# Patient Record
Sex: FEMALE | Race: WHITE | NOT HISPANIC OR LATINO
[De-identification: names, ages, dates, MRNs, and addresses within clinical notes are randomized per-mention and may not be internally consistent; named-entity substitution may affect disease eponyms.]

---

## 2017-03-07 ENCOUNTER — APPOINTMENT (OUTPATIENT)
Dept: CARDIOLOGY | Facility: CLINIC | Age: 77
End: 2017-03-07

## 2017-03-07 ENCOUNTER — OUTPATIENT (OUTPATIENT)
Dept: OUTPATIENT SERVICES | Facility: HOSPITAL | Age: 77
LOS: 1 days | End: 2017-03-07
Payer: MEDICARE

## 2017-03-07 VITALS
RESPIRATION RATE: 16 BRPM | DIASTOLIC BLOOD PRESSURE: 74 MMHG | BODY MASS INDEX: 29.88 KG/M2 | SYSTOLIC BLOOD PRESSURE: 191 MMHG | HEIGHT: 64 IN | OXYGEN SATURATION: 95 % | WEIGHT: 175 LBS | HEART RATE: 61 BPM

## 2017-03-07 DIAGNOSIS — I10 ESSENTIAL (PRIMARY) HYPERTENSION: ICD-10-CM

## 2017-03-07 DIAGNOSIS — Z00.8 ENCOUNTER FOR OTHER GENERAL EXAMINATION: ICD-10-CM

## 2017-03-07 PROCEDURE — G0463: CPT | Mod: 25

## 2017-03-07 PROCEDURE — 93005 ELECTROCARDIOGRAM TRACING: CPT

## 2017-03-07 PROCEDURE — 93010 ELECTROCARDIOGRAM REPORT: CPT

## 2017-03-10 ENCOUNTER — RX RENEWAL (OUTPATIENT)
Age: 77
End: 2017-03-10

## 2017-03-23 ENCOUNTER — RX RENEWAL (OUTPATIENT)
Age: 77
End: 2017-03-23

## 2017-07-03 ENCOUNTER — LABORATORY RESULT (OUTPATIENT)
Age: 77
End: 2017-07-03

## 2017-07-03 ENCOUNTER — APPOINTMENT (OUTPATIENT)
Dept: INTERNAL MEDICINE | Facility: CLINIC | Age: 77
End: 2017-07-03

## 2017-07-03 VITALS
RESPIRATION RATE: 16 BRPM | BODY MASS INDEX: 28.99 KG/M2 | TEMPERATURE: 98.2 F | DIASTOLIC BLOOD PRESSURE: 84 MMHG | OXYGEN SATURATION: 98 % | HEIGHT: 65 IN | HEART RATE: 58 BPM | WEIGHT: 174 LBS | SYSTOLIC BLOOD PRESSURE: 140 MMHG

## 2017-07-03 DIAGNOSIS — Z82.49 FAMILY HISTORY OF ISCHEMIC HEART DISEASE AND OTHER DISEASES OF THE CIRCULATORY SYSTEM: ICD-10-CM

## 2017-07-03 DIAGNOSIS — Z83.3 FAMILY HISTORY OF DIABETES MELLITUS: ICD-10-CM

## 2017-07-05 LAB
25(OH)D3 SERPL-MCNC: 33.7 NG/ML
ALBUMIN SERPL ELPH-MCNC: 4.2 G/DL
ALP BLD-CCNC: 31 U/L
ALT SERPL-CCNC: 10 U/L
ANION GAP SERPL CALC-SCNC: 20 MMOL/L
APPEARANCE: ABNORMAL
AST SERPL-CCNC: 11 U/L
BASOPHILS # BLD AUTO: 0.07 K/UL
BASOPHILS NFR BLD AUTO: 0.8 %
BILIRUB SERPL-MCNC: 0.7 MG/DL
BILIRUBIN URINE: NEGATIVE
BLOOD URINE: NEGATIVE
BUN SERPL-MCNC: 43 MG/DL
CALCIUM SERPL-MCNC: 10.2 MG/DL
CHLORIDE SERPL-SCNC: 100 MMOL/L
CHOLEST SERPL-MCNC: 233 MG/DL
CHOLEST/HDLC SERPL: 4.5 RATIO
CO2 SERPL-SCNC: 23 MMOL/L
COLOR: YELLOW
CREAT SERPL-MCNC: 1.67 MG/DL
CREAT SPEC-SCNC: 87 MG/DL
EOSINOPHIL # BLD AUTO: 0.37 K/UL
EOSINOPHIL NFR BLD AUTO: 4.1 %
ERYTHROCYTE [SEDIMENTATION RATE] IN BLOOD BY WESTERGREN METHOD: 20 MM/HR
FOLATE SERPL-MCNC: 13.7 NG/ML
GGT SERPL-CCNC: 14 U/L
GLUCOSE QUALITATIVE U: NORMAL MG/DL
GLUCOSE SERPL-MCNC: 125 MG/DL
HBA1C MFR BLD HPLC: 5.8 %
HCT VFR BLD CALC: 37.1 %
HDLC SERPL-MCNC: 52 MG/DL
HGB BLD-MCNC: 12.3 G/DL
IMM GRANULOCYTES NFR BLD AUTO: 0.2 %
IRON SATN MFR SERPL: 38 %
IRON SERPL-MCNC: 100 UG/DL
KETONES URINE: NEGATIVE
LDLC SERPL CALC-MCNC: 151 MG/DL
LEUKOCYTE ESTERASE URINE: ABNORMAL
LYMPHOCYTES # BLD AUTO: 2.76 K/UL
LYMPHOCYTES NFR BLD AUTO: 30.2 %
MAN DIFF?: NORMAL
MCHC RBC-ENTMCNC: 32.2 PG
MCHC RBC-ENTMCNC: 33.2 GM/DL
MCV RBC AUTO: 97.1 FL
MICROALBUMIN 24H UR DL<=1MG/L-MCNC: 5.5 MG/DL
MICROALBUMIN/CREAT 24H UR-RTO: 63 MG/G
MONOCYTES # BLD AUTO: 0.93 K/UL
MONOCYTES NFR BLD AUTO: 10.2 %
NEUTROPHILS # BLD AUTO: 4.98 K/UL
NEUTROPHILS NFR BLD AUTO: 54.5 %
NITRITE URINE: NEGATIVE
PH URINE: 6.5
PLATELET # BLD AUTO: 235 K/UL
POTASSIUM SERPL-SCNC: 4.4 MMOL/L
PROT SERPL-MCNC: 6.9 G/DL
PROTEIN URINE: NEGATIVE MG/DL
RBC # BLD: 3.82 M/UL
RBC # FLD: 13.2 %
SODIUM SERPL-SCNC: 143 MMOL/L
SPECIFIC GRAVITY URINE: 1.01
T3 SERPL-MCNC: 67 NG/DL
T4 FREE SERPL-MCNC: 1.2 NG/DL
TIBC SERPL-MCNC: 266 UG/DL
TRIGL SERPL-MCNC: 152 MG/DL
TSH SERPL-ACNC: 1.55 UIU/ML
UIBC SERPL-MCNC: 166 UG/DL
UROBILINOGEN URINE: NORMAL MG/DL
VIT B12 SERPL-MCNC: 283 PG/ML
WBC # FLD AUTO: 9.13 K/UL

## 2017-09-05 ENCOUNTER — APPOINTMENT (OUTPATIENT)
Dept: CARDIOLOGY | Facility: CLINIC | Age: 77
End: 2017-09-05
Payer: MEDICARE

## 2017-09-05 ENCOUNTER — OUTPATIENT (OUTPATIENT)
Dept: OUTPATIENT SERVICES | Facility: HOSPITAL | Age: 77
LOS: 1 days | End: 2017-09-05
Payer: MEDICARE

## 2017-09-05 VITALS
WEIGHT: 174 LBS | HEART RATE: 60 BPM | DIASTOLIC BLOOD PRESSURE: 84 MMHG | OXYGEN SATURATION: 98 % | RESPIRATION RATE: 16 BRPM | HEIGHT: 65 IN | SYSTOLIC BLOOD PRESSURE: 150 MMHG | BODY MASS INDEX: 28.99 KG/M2

## 2017-09-05 DIAGNOSIS — Z00.8 ENCOUNTER FOR OTHER GENERAL EXAMINATION: ICD-10-CM

## 2017-09-05 PROCEDURE — 93005 ELECTROCARDIOGRAM TRACING: CPT

## 2017-09-05 PROCEDURE — ZZZZZ: CPT

## 2017-09-05 PROCEDURE — 93010 ELECTROCARDIOGRAM REPORT: CPT

## 2017-09-05 PROCEDURE — G0463: CPT | Mod: 25

## 2017-09-06 DIAGNOSIS — I10 ESSENTIAL (PRIMARY) HYPERTENSION: ICD-10-CM

## 2017-09-11 ENCOUNTER — RX RENEWAL (OUTPATIENT)
Age: 77
End: 2017-09-11

## 2017-09-26 ENCOUNTER — OUTPATIENT (OUTPATIENT)
Dept: OUTPATIENT SERVICES | Facility: HOSPITAL | Age: 77
LOS: 1 days | End: 2017-09-26
Payer: MEDICARE

## 2017-09-26 ENCOUNTER — APPOINTMENT (OUTPATIENT)
Dept: CARDIOLOGY | Facility: CLINIC | Age: 77
End: 2017-09-26

## 2017-09-26 DIAGNOSIS — Z00.8 ENCOUNTER FOR OTHER GENERAL EXAMINATION: ICD-10-CM

## 2017-09-26 PROCEDURE — 93306 TTE W/DOPPLER COMPLETE: CPT

## 2017-09-26 PROCEDURE — 93306 TTE W/DOPPLER COMPLETE: CPT | Mod: 26

## 2017-10-06 ENCOUNTER — MEDICATION RENEWAL (OUTPATIENT)
Age: 77
End: 2017-10-06

## 2017-10-09 ENCOUNTER — APPOINTMENT (OUTPATIENT)
Dept: INTERNAL MEDICINE | Facility: CLINIC | Age: 77
End: 2017-10-09
Payer: MEDICARE

## 2017-10-09 VITALS
SYSTOLIC BLOOD PRESSURE: 140 MMHG | WEIGHT: 170 LBS | HEIGHT: 65 IN | BODY MASS INDEX: 28.32 KG/M2 | RESPIRATION RATE: 16 BRPM | OXYGEN SATURATION: 99 % | HEART RATE: 49 BPM | DIASTOLIC BLOOD PRESSURE: 80 MMHG | TEMPERATURE: 98.2 F

## 2017-10-09 PROCEDURE — 99214 OFFICE O/P EST MOD 30 MIN: CPT | Mod: 25

## 2017-10-09 PROCEDURE — G0008: CPT

## 2017-10-09 PROCEDURE — 90686 IIV4 VACC NO PRSV 0.5 ML IM: CPT

## 2017-11-10 ENCOUNTER — RX RENEWAL (OUTPATIENT)
Age: 77
End: 2017-11-10

## 2017-11-11 LAB
ALBUMIN SERPL ELPH-MCNC: 4.1 G/DL
ALP BLD-CCNC: 29 U/L
ALT SERPL-CCNC: 5 U/L
ANION GAP SERPL CALC-SCNC: 13 MMOL/L
AST SERPL-CCNC: 11 U/L
BASOPHILS # BLD AUTO: 0.07 K/UL
BASOPHILS NFR BLD AUTO: 0.7 %
BILIRUB SERPL-MCNC: 0.7 MG/DL
BUN SERPL-MCNC: 45 MG/DL
CALCIUM SERPL-MCNC: 9.5 MG/DL
CHLORIDE SERPL-SCNC: 102 MMOL/L
CHOLEST SERPL-MCNC: 262 MG/DL
CHOLEST/HDLC SERPL: 5.7 RATIO
CO2 SERPL-SCNC: 26 MMOL/L
CREAT SERPL-MCNC: 1.74 MG/DL
EOSINOPHIL # BLD AUTO: 0.41 K/UL
EOSINOPHIL NFR BLD AUTO: 4.4 %
GGT SERPL-CCNC: 14 U/L
GLUCOSE SERPL-MCNC: 131 MG/DL
HBA1C MFR BLD HPLC: 6 %
HCT VFR BLD CALC: 37.4 %
HDLC SERPL-MCNC: 46 MG/DL
HGB BLD-MCNC: 12 G/DL
IMM GRANULOCYTES NFR BLD AUTO: 0.2 %
LDLC SERPL CALC-MCNC: 180 MG/DL
LYMPHOCYTES # BLD AUTO: 2.67 K/UL
LYMPHOCYTES NFR BLD AUTO: 28.6 %
MAN DIFF?: NORMAL
MCHC RBC-ENTMCNC: 31.3 PG
MCHC RBC-ENTMCNC: 32.1 GM/DL
MCV RBC AUTO: 97.7 FL
MONOCYTES # BLD AUTO: 0.81 K/UL
MONOCYTES NFR BLD AUTO: 8.7 %
NEUTROPHILS # BLD AUTO: 5.36 K/UL
NEUTROPHILS NFR BLD AUTO: 57.4 %
PLATELET # BLD AUTO: 266 K/UL
POTASSIUM SERPL-SCNC: 5.2 MMOL/L
PROT SERPL-MCNC: 7.2 G/DL
RBC # BLD: 3.83 M/UL
RBC # FLD: 13.2 %
SODIUM SERPL-SCNC: 141 MMOL/L
TRIGL SERPL-MCNC: 182 MG/DL
WBC # FLD AUTO: 9.34 K/UL

## 2017-11-17 ENCOUNTER — RX RENEWAL (OUTPATIENT)
Age: 77
End: 2017-11-17

## 2017-11-24 ENCOUNTER — LABORATORY RESULT (OUTPATIENT)
Age: 77
End: 2017-11-24

## 2017-11-30 ENCOUNTER — APPOINTMENT (OUTPATIENT)
Dept: NEPHROLOGY | Facility: CLINIC | Age: 77
End: 2017-11-30
Payer: MEDICARE

## 2017-11-30 VITALS
HEIGHT: 65 IN | DIASTOLIC BLOOD PRESSURE: 97 MMHG | HEART RATE: 77 BPM | SYSTOLIC BLOOD PRESSURE: 197 MMHG | WEIGHT: 175 LBS | OXYGEN SATURATION: 97 % | BODY MASS INDEX: 29.16 KG/M2

## 2017-11-30 DIAGNOSIS — N18.2 CHRONIC KIDNEY DISEASE, STAGE 2 (MILD): ICD-10-CM

## 2017-11-30 PROCEDURE — 99205 OFFICE O/P NEW HI 60 MIN: CPT

## 2017-12-15 ENCOUNTER — OUTPATIENT (OUTPATIENT)
Dept: OUTPATIENT SERVICES | Facility: HOSPITAL | Age: 77
LOS: 1 days | End: 2017-12-15
Payer: MEDICARE

## 2017-12-15 ENCOUNTER — APPOINTMENT (OUTPATIENT)
Dept: ULTRASOUND IMAGING | Facility: HOSPITAL | Age: 77
End: 2017-12-15
Payer: MEDICARE

## 2017-12-15 DIAGNOSIS — N18.2 CHRONIC KIDNEY DISEASE, STAGE 2 (MILD): ICD-10-CM

## 2017-12-15 LAB
APPEARANCE: CLEAR
BACTERIA UR CULT: ABNORMAL
BACTERIA: NEGATIVE
BILIRUBIN URINE: NEGATIVE
BLOOD URINE: NEGATIVE
COLOR: YELLOW
GLUCOSE QUALITATIVE U: NEGATIVE MG/DL
KETONES URINE: NEGATIVE
LEUKOCYTE ESTERASE URINE: ABNORMAL
MICROSCOPIC-UA: NORMAL
NITRITE URINE: NEGATIVE
PH URINE: 5.5
PROTEIN URINE: NEGATIVE MG/DL
RED BLOOD CELLS URINE: 1 /HPF
SPECIFIC GRAVITY URINE: 1.02
SQUAMOUS EPITHELIAL CELLS: 4 /HPF
UROBILINOGEN URINE: NEGATIVE MG/DL
WHITE BLOOD CELLS URINE: 3 /HPF

## 2017-12-15 PROCEDURE — 76775 US EXAM ABDO BACK WALL LIM: CPT | Mod: 26

## 2017-12-15 PROCEDURE — 76775 US EXAM ABDO BACK WALL LIM: CPT

## 2018-01-11 ENCOUNTER — APPOINTMENT (OUTPATIENT)
Dept: INTERNAL MEDICINE | Facility: CLINIC | Age: 78
End: 2018-01-11
Payer: MEDICARE

## 2018-01-11 VITALS
WEIGHT: 170 LBS | BODY MASS INDEX: 28.67 KG/M2 | HEART RATE: 57 BPM | DIASTOLIC BLOOD PRESSURE: 90 MMHG | OXYGEN SATURATION: 97 % | TEMPERATURE: 97.3 F | SYSTOLIC BLOOD PRESSURE: 150 MMHG | RESPIRATION RATE: 18 BRPM | HEIGHT: 64.5 IN

## 2018-01-11 PROCEDURE — 99214 OFFICE O/P EST MOD 30 MIN: CPT

## 2018-01-13 LAB
ALBUMIN SERPL ELPH-MCNC: 4 G/DL
ALP BLD-CCNC: 34 U/L
ALT SERPL-CCNC: 8 U/L
ANION GAP SERPL CALC-SCNC: 18 MMOL/L
AST SERPL-CCNC: 12 U/L
BASOPHILS # BLD AUTO: 0.06 K/UL
BASOPHILS NFR BLD AUTO: 0.5 %
BILIRUB SERPL-MCNC: 0.5 MG/DL
BUN SERPL-MCNC: 57 MG/DL
CALCIUM SERPL-MCNC: 10.2 MG/DL
CHLORIDE SERPL-SCNC: 104 MMOL/L
CHOLEST SERPL-MCNC: 227 MG/DL
CHOLEST/HDLC SERPL: 5 RATIO
CO2 SERPL-SCNC: 24 MMOL/L
CREAT SERPL-MCNC: 1.92 MG/DL
EOSINOPHIL # BLD AUTO: 0.37 K/UL
EOSINOPHIL NFR BLD AUTO: 3.2 %
GGT SERPL-CCNC: 12 U/L
GLUCOSE SERPL-MCNC: 146 MG/DL
HBA1C MFR BLD HPLC: 5.8 %
HCT VFR BLD CALC: 40.2 %
HDLC SERPL-MCNC: 45 MG/DL
HEMOCCULT STL QL IA: NEGATIVE
HGB BLD-MCNC: 12.7 G/DL
IMM GRANULOCYTES NFR BLD AUTO: 0.2 %
LDLC SERPL CALC-MCNC: 153 MG/DL
LYMPHOCYTES # BLD AUTO: 2.51 K/UL
LYMPHOCYTES NFR BLD AUTO: 21.8 %
MAN DIFF?: NORMAL
MCHC RBC-ENTMCNC: 31.6 GM/DL
MCHC RBC-ENTMCNC: 32.3 PG
MCV RBC AUTO: 102.3 FL
MONOCYTES # BLD AUTO: 0.86 K/UL
MONOCYTES NFR BLD AUTO: 7.5 %
NEUTROPHILS # BLD AUTO: 7.7 K/UL
NEUTROPHILS NFR BLD AUTO: 66.8 %
PLATELET # BLD AUTO: 266 K/UL
POTASSIUM SERPL-SCNC: 5.7 MMOL/L
PROT SERPL-MCNC: 7 G/DL
RBC # BLD: 3.93 M/UL
RBC # FLD: 14 %
SODIUM SERPL-SCNC: 146 MMOL/L
TRIGL SERPL-MCNC: 146 MG/DL
WBC # FLD AUTO: 11.52 K/UL

## 2018-02-23 ENCOUNTER — APPOINTMENT (OUTPATIENT)
Dept: NEPHROLOGY | Facility: CLINIC | Age: 78
End: 2018-02-23
Payer: MEDICARE

## 2018-02-23 VITALS — HEART RATE: 48 BPM | DIASTOLIC BLOOD PRESSURE: 71 MMHG | SYSTOLIC BLOOD PRESSURE: 181 MMHG

## 2018-02-23 VITALS
SYSTOLIC BLOOD PRESSURE: 222 MMHG | OXYGEN SATURATION: 98 % | HEIGHT: 64.5 IN | BODY MASS INDEX: 29.01 KG/M2 | HEART RATE: 58 BPM | WEIGHT: 172 LBS | DIASTOLIC BLOOD PRESSURE: 101 MMHG

## 2018-02-23 PROCEDURE — 99214 OFFICE O/P EST MOD 30 MIN: CPT

## 2018-03-05 LAB
ALBUMIN SERPL ELPH-MCNC: 4.2 G/DL
ALDOSTERONE SERUM: 14.8 NG/DL
ANION GAP SERPL CALC-SCNC: 15 MMOL/L
BUN SERPL-MCNC: 40 MG/DL
CALCIUM SERPL-MCNC: 10 MG/DL
CHLORIDE SERPL-SCNC: 98 MMOL/L
CO2 SERPL-SCNC: 26 MMOL/L
CREAT SERPL-MCNC: 1.56 MG/DL
GLUCOSE SERPL-MCNC: 128 MG/DL
PHOSPHATE SERPL-MCNC: 4.3 MG/DL
POTASSIUM SERPL-SCNC: 4.3 MMOL/L
RENIN ACTIVITY, PLASMA: 0.17 NG/ML/HR
SODIUM SERPL-SCNC: 139 MMOL/L

## 2018-03-23 ENCOUNTER — APPOINTMENT (OUTPATIENT)
Dept: NEPHROLOGY | Facility: CLINIC | Age: 78
End: 2018-03-23
Payer: MEDICARE

## 2018-03-23 VITALS — DIASTOLIC BLOOD PRESSURE: 81 MMHG | SYSTOLIC BLOOD PRESSURE: 187 MMHG | HEART RATE: 50 BPM

## 2018-03-23 VITALS — HEART RATE: 49 BPM | SYSTOLIC BLOOD PRESSURE: 180 MMHG | DIASTOLIC BLOOD PRESSURE: 91 MMHG

## 2018-03-23 VITALS
HEART RATE: 60 BPM | HEIGHT: 64.5 IN | SYSTOLIC BLOOD PRESSURE: 213 MMHG | DIASTOLIC BLOOD PRESSURE: 85 MMHG | WEIGHT: 170 LBS | OXYGEN SATURATION: 97 % | BODY MASS INDEX: 28.67 KG/M2

## 2018-03-23 LAB
ALBUMIN SERPL ELPH-MCNC: 4.4 G/DL
ANION GAP SERPL CALC-SCNC: 16 MMOL/L
BUN SERPL-MCNC: 36 MG/DL
CALCIUM SERPL-MCNC: 10 MG/DL
CHLORIDE SERPL-SCNC: 100 MMOL/L
CO2 SERPL-SCNC: 25 MMOL/L
CREAT SERPL-MCNC: 1.59 MG/DL
GLUCOSE SERPL-MCNC: 137 MG/DL
PHOSPHATE SERPL-MCNC: 4.1 MG/DL
POTASSIUM SERPL-SCNC: 5.9 MMOL/L
SODIUM SERPL-SCNC: 141 MMOL/L

## 2018-03-23 PROCEDURE — 99215 OFFICE O/P EST HI 40 MIN: CPT

## 2018-04-08 ENCOUNTER — LABORATORY RESULT (OUTPATIENT)
Age: 78
End: 2018-04-08

## 2018-04-16 ENCOUNTER — APPOINTMENT (OUTPATIENT)
Dept: INTERNAL MEDICINE | Facility: CLINIC | Age: 78
End: 2018-04-16
Payer: MEDICARE

## 2018-04-16 VITALS
SYSTOLIC BLOOD PRESSURE: 140 MMHG | RESPIRATION RATE: 16 BRPM | DIASTOLIC BLOOD PRESSURE: 80 MMHG | WEIGHT: 170 LBS | HEART RATE: 60 BPM | HEIGHT: 64 IN | OXYGEN SATURATION: 96 % | TEMPERATURE: 98 F | BODY MASS INDEX: 29.02 KG/M2

## 2018-04-16 PROCEDURE — 99214 OFFICE O/P EST MOD 30 MIN: CPT

## 2018-06-08 LAB
ALBUMIN SERPL ELPH-MCNC: 4.2 G/DL
ALP BLD-CCNC: 35 U/L
ALT SERPL-CCNC: 9 U/L
ANION GAP SERPL CALC-SCNC: 14 MMOL/L
AST SERPL-CCNC: 11 U/L
BASOPHILS # BLD AUTO: 0.06 K/UL
BASOPHILS NFR BLD AUTO: 0.5 %
BILIRUB SERPL-MCNC: 0.6 MG/DL
BUN SERPL-MCNC: 43 MG/DL
CALCIUM SERPL-MCNC: 9.8 MG/DL
CHLORIDE SERPL-SCNC: 101 MMOL/L
CHOLEST SERPL-MCNC: 238 MG/DL
CHOLEST/HDLC SERPL: 5.2 RATIO
CO2 SERPL-SCNC: 23 MMOL/L
CREAT SERPL-MCNC: 1.69 MG/DL
EOSINOPHIL # BLD AUTO: 0.34 K/UL
EOSINOPHIL NFR BLD AUTO: 2.9 %
GGT SERPL-CCNC: 15 U/L
GLUCOSE SERPL-MCNC: 145 MG/DL
HBA1C MFR BLD HPLC: 6.1 %
HCT VFR BLD CALC: 41.1 %
HDLC SERPL-MCNC: 46 MG/DL
HGB BLD-MCNC: 13 G/DL
IMM GRANULOCYTES NFR BLD AUTO: 0.3 %
LDLC SERPL CALC-MCNC: 150 MG/DL
LYMPHOCYTES # BLD AUTO: 2.8 K/UL
LYMPHOCYTES NFR BLD AUTO: 24.2 %
MAN DIFF?: NORMAL
MCHC RBC-ENTMCNC: 31.2 PG
MCHC RBC-ENTMCNC: 31.6 GM/DL
MCV RBC AUTO: 98.6 FL
MONOCYTES # BLD AUTO: 1 K/UL
MONOCYTES NFR BLD AUTO: 8.6 %
NEUTROPHILS # BLD AUTO: 7.34 K/UL
NEUTROPHILS NFR BLD AUTO: 63.5 %
PLATELET # BLD AUTO: 300 K/UL
POTASSIUM SERPL-SCNC: 5.1 MMOL/L
PROT SERPL-MCNC: 7.1 G/DL
RBC # BLD: 4.17 M/UL
RBC # FLD: 14.3 %
SODIUM SERPL-SCNC: 138 MMOL/L
TRIGL SERPL-MCNC: 208 MG/DL
WBC # FLD AUTO: 11.57 K/UL

## 2018-06-15 ENCOUNTER — APPOINTMENT (OUTPATIENT)
Dept: NEPHROLOGY | Facility: CLINIC | Age: 78
End: 2018-06-15
Payer: MEDICARE

## 2018-06-15 VITALS — DIASTOLIC BLOOD PRESSURE: 80 MMHG | HEART RATE: 57 BPM | SYSTOLIC BLOOD PRESSURE: 154 MMHG

## 2018-06-15 VITALS
SYSTOLIC BLOOD PRESSURE: 163 MMHG | OXYGEN SATURATION: 97 % | HEIGHT: 64 IN | DIASTOLIC BLOOD PRESSURE: 81 MMHG | WEIGHT: 173.06 LBS | HEART RATE: 60 BPM | BODY MASS INDEX: 29.55 KG/M2

## 2018-06-15 PROCEDURE — 99214 OFFICE O/P EST MOD 30 MIN: CPT

## 2018-06-26 ENCOUNTER — APPOINTMENT (OUTPATIENT)
Dept: INTERNAL MEDICINE | Facility: CLINIC | Age: 78
End: 2018-06-26
Payer: MEDICARE

## 2018-06-26 VITALS
HEART RATE: 57 BPM | WEIGHT: 168 LBS | TEMPERATURE: 97.7 F | RESPIRATION RATE: 16 BRPM | SYSTOLIC BLOOD PRESSURE: 130 MMHG | BODY MASS INDEX: 28.68 KG/M2 | OXYGEN SATURATION: 94 % | DIASTOLIC BLOOD PRESSURE: 80 MMHG | HEIGHT: 64 IN

## 2018-06-26 PROCEDURE — G0009: CPT

## 2018-06-26 PROCEDURE — 99214 OFFICE O/P EST MOD 30 MIN: CPT | Mod: 25

## 2018-06-26 PROCEDURE — 90670 PCV13 VACCINE IM: CPT

## 2018-06-26 NOTE — HISTORY OF PRESENT ILLNESS
[No therapy] : Patient is not on statin therapy [de-identified] : 78 year old  female patient with history of stable Essential Hypertension, Type 2 Diabetes Mellitus with hyperglycemia, Hypercholesterolemia with Hypertriglyceridemia, Chronic Renal Failure stage -3, history as stated, presented for follow up examination of Elevated BP checked. Patient is compliant with all medications. Denies shortness of breath, chest pain or abdominal pains at this time. ROS as stated.\par

## 2018-06-26 NOTE — PHYSICAL EXAM
[Comprehensive Foot Exam Normal] : Right and left foot were examined and both feet are normal. No ulcers in either foot. Toes are normal and with full ROM.  Normal tactile sensation with monofilament testing throughout both feet [de-identified] : Small blister left 4/5 toes. Derm f/u as counseled.

## 2018-06-26 NOTE — ASSESSMENT
[FreeTextEntry1] : 78 year old female found to have stable Essential Hypertension, Type 2 Diabetes Mellitus with hyperglycemia, Hypercholesterolemia with Hypertriglyceridemia, Chronic Renal Failure stage -3,with the current regimen, diet and life style modifications, as counseled. Prior results reviewed and discussed with the patient during today's examination. Plan as ordered.\par

## 2018-06-26 NOTE — HEALTH RISK ASSESSMENT
[Discussed at today's visit] : Advance Directives Discussed at today's visit [Fully functional (bathing, dressing, toileting, transferring, walking, feeding)] : Fully functional (bathing, dressing, toileting, transferring, walking, feeding) [Fully functional (using the telephone, shopping, preparing meals, housekeeping, doing laundry, using] : Fully functional and needs no help or supervision to perform IADLs (using the telephone, shopping, preparing meals, housekeeping, doing laundry, using transportation, managing medications and managing finances) [No falls in past year] : Patient reported no falls in the past year [0] : 2) Feeling down, depressed, or hopeless: Not at all (0) [] : No [de-identified] : NEPH [[id number]] : 0

## 2018-07-09 ENCOUNTER — APPOINTMENT (OUTPATIENT)
Dept: INTERNAL MEDICINE | Facility: CLINIC | Age: 78
End: 2018-07-09

## 2018-07-11 ENCOUNTER — APPOINTMENT (OUTPATIENT)
Dept: PODIATRY | Facility: CLINIC | Age: 78
End: 2018-07-11

## 2018-07-11 ENCOUNTER — OUTPATIENT (OUTPATIENT)
Dept: OUTPATIENT SERVICES | Facility: HOSPITAL | Age: 78
LOS: 1 days | End: 2018-07-11
Payer: MEDICARE

## 2018-07-11 VITALS
DIASTOLIC BLOOD PRESSURE: 81 MMHG | SYSTOLIC BLOOD PRESSURE: 189 MMHG | OXYGEN SATURATION: 95 % | WEIGHT: 168 LBS | RESPIRATION RATE: 18 BRPM | BODY MASS INDEX: 28.68 KG/M2 | HEIGHT: 64 IN | HEART RATE: 68 BPM

## 2018-07-11 DIAGNOSIS — Z00.00 ENCOUNTER FOR GENERAL ADULT MEDICAL EXAMINATION WITHOUT ABNORMAL FINDINGS: ICD-10-CM

## 2018-07-11 DIAGNOSIS — L11.0 ACQUIRED KERATOSIS FOLLICULARIS: ICD-10-CM

## 2018-07-11 DIAGNOSIS — M20.42 OTHER HAMMER TOE(S) (ACQUIRED), LEFT FOOT: ICD-10-CM

## 2018-07-11 PROCEDURE — G0463: CPT

## 2018-07-11 PROCEDURE — 11055 PARING/CUTG B9 HYPRKER LES 1: CPT

## 2018-07-25 ENCOUNTER — APPOINTMENT (OUTPATIENT)
Dept: PODIATRY | Facility: CLINIC | Age: 78
End: 2018-07-25

## 2018-07-25 ENCOUNTER — OUTPATIENT (OUTPATIENT)
Dept: OUTPATIENT SERVICES | Facility: HOSPITAL | Age: 78
LOS: 1 days | End: 2018-07-25
Payer: MEDICARE

## 2018-07-25 VITALS
DIASTOLIC BLOOD PRESSURE: 69 MMHG | HEIGHT: 64 IN | WEIGHT: 168 LBS | RESPIRATION RATE: 18 BRPM | OXYGEN SATURATION: 85 % | SYSTOLIC BLOOD PRESSURE: 124 MMHG | TEMPERATURE: 97.7 F | HEART RATE: 85 BPM | BODY MASS INDEX: 28.68 KG/M2

## 2018-07-25 DIAGNOSIS — Z00.00 ENCOUNTER FOR GENERAL ADULT MEDICAL EXAMINATION WITHOUT ABNORMAL FINDINGS: ICD-10-CM

## 2018-07-25 PROCEDURE — G0463: CPT

## 2018-07-26 DIAGNOSIS — M20.42 OTHER HAMMER TOE(S) (ACQUIRED), LEFT FOOT: ICD-10-CM

## 2018-07-26 DIAGNOSIS — M79.672 PAIN IN LEFT FOOT: ICD-10-CM

## 2018-09-28 ENCOUNTER — APPOINTMENT (OUTPATIENT)
Dept: NEPHROLOGY | Facility: CLINIC | Age: 78
End: 2018-09-28
Payer: MEDICARE

## 2018-09-28 VITALS
WEIGHT: 165.34 LBS | BODY MASS INDEX: 28.23 KG/M2 | OXYGEN SATURATION: 98 % | HEIGHT: 64 IN | DIASTOLIC BLOOD PRESSURE: 71 MMHG | HEART RATE: 65 BPM | SYSTOLIC BLOOD PRESSURE: 166 MMHG

## 2018-09-28 VITALS — DIASTOLIC BLOOD PRESSURE: 74 MMHG | HEART RATE: 53 BPM | SYSTOLIC BLOOD PRESSURE: 148 MMHG

## 2018-09-28 PROCEDURE — 99214 OFFICE O/P EST MOD 30 MIN: CPT

## 2018-10-16 ENCOUNTER — APPOINTMENT (OUTPATIENT)
Dept: INTERNAL MEDICINE | Facility: CLINIC | Age: 78
End: 2018-10-16
Payer: MEDICARE

## 2018-10-16 ENCOUNTER — NON-APPOINTMENT (OUTPATIENT)
Age: 78
End: 2018-10-16

## 2018-10-16 VITALS
TEMPERATURE: 97.8 F | DIASTOLIC BLOOD PRESSURE: 80 MMHG | WEIGHT: 167 LBS | SYSTOLIC BLOOD PRESSURE: 140 MMHG | RESPIRATION RATE: 16 BRPM | OXYGEN SATURATION: 97 % | HEART RATE: 90 BPM | HEIGHT: 64 IN | BODY MASS INDEX: 28.51 KG/M2

## 2018-10-16 PROCEDURE — 93000 ELECTROCARDIOGRAM COMPLETE: CPT

## 2018-10-16 PROCEDURE — 99214 OFFICE O/P EST MOD 30 MIN: CPT | Mod: 25

## 2018-10-16 NOTE — HISTORY OF PRESENT ILLNESS
[de-identified] : 78 year old  female patient with history of stable Essential Hypertension, Type 2 Diabetes Mellitus with hyperglycemia, Hypercholesterolemia with Hypertriglyceridemia, Chronic Renal Failure stage -3, history as stated, presented for follow up examination of Elevated BP checked. Patient is compliant with all medications. Denies shortness of breath, chest pain or abdominal pains at this time. ROS as stated.\par

## 2018-10-16 NOTE — ASSESSMENT
[FreeTextEntry1] : 78 year old female found to have stable ,with the current regimen, diet and life style modifications, as counseled. Prior results reviewed and discussed with the patient during today's examination. Plan as ordered.\par EKG showed NSR at the rate of 61 BPM, LAD, known non-sp ST-T changes noted.\par Patient was recently evaluated by [facility], findings and recommendations reviewed with the patient during today's examination.\par

## 2018-10-16 NOTE — HEALTH RISK ASSESSMENT
[No falls in past year] : Patient reported no falls in the past year [0] : 2) Feeling down, depressed, or hopeless: Not at all (0) [] : No [de-identified] : NEPH [[id number]] : 0

## 2018-10-25 ENCOUNTER — APPOINTMENT (OUTPATIENT)
Dept: PODIATRY | Facility: CLINIC | Age: 78
End: 2018-10-25

## 2018-10-25 LAB
25(OH)D3 SERPL-MCNC: 46.9 NG/ML
ALBUMIN SERPL ELPH-MCNC: 4.3 G/DL
ALP BLD-CCNC: 30 U/L
ALT SERPL-CCNC: 6 U/L
ANION GAP SERPL CALC-SCNC: 16 MMOL/L
AST SERPL-CCNC: 8 U/L
BASOPHILS # BLD AUTO: 0.04 K/UL
BASOPHILS NFR BLD AUTO: 0.4 %
BILIRUB SERPL-MCNC: 0.7 MG/DL
BUN SERPL-MCNC: 50 MG/DL
CALCIUM SERPL-MCNC: 9.9 MG/DL
CHLORIDE SERPL-SCNC: 99 MMOL/L
CHOLEST SERPL-MCNC: 223 MG/DL
CHOLEST/HDLC SERPL: 5.7 RATIO
CO2 SERPL-SCNC: 24 MMOL/L
CREAT SERPL-MCNC: 2.01 MG/DL
EOSINOPHIL # BLD AUTO: 0.35 K/UL
EOSINOPHIL NFR BLD AUTO: 3.4 %
ERYTHROCYTE [SEDIMENTATION RATE] IN BLOOD BY WESTERGREN METHOD: 10 MM/HR
FOLATE SERPL-MCNC: 8 NG/ML
GGT SERPL-CCNC: 19 U/L
GLUCOSE SERPL-MCNC: 120 MG/DL
HBA1C MFR BLD HPLC: 5.8 %
HCT VFR BLD CALC: 40.2 %
HDLC SERPL-MCNC: 39 MG/DL
HGB BLD-MCNC: 13.2 G/DL
IMM GRANULOCYTES NFR BLD AUTO: 0.1 %
IRON SATN MFR SERPL: 33 %
IRON SERPL-MCNC: 103 UG/DL
LDLC SERPL CALC-MCNC: 155 MG/DL
LYMPHOCYTES # BLD AUTO: 3.56 K/UL
LYMPHOCYTES NFR BLD AUTO: 35.1 %
MAN DIFF?: NORMAL
MCHC RBC-ENTMCNC: 32.3 PG
MCHC RBC-ENTMCNC: 32.8 GM/DL
MCV RBC AUTO: 98.3 FL
MONOCYTES # BLD AUTO: 0.89 K/UL
MONOCYTES NFR BLD AUTO: 8.8 %
NEUTROPHILS # BLD AUTO: 5.3 K/UL
NEUTROPHILS NFR BLD AUTO: 52.2 %
PLATELET # BLD AUTO: 298 K/UL
POTASSIUM SERPL-SCNC: 4.4 MMOL/L
PROT SERPL-MCNC: 7.3 G/DL
RBC # BLD: 4.09 M/UL
RBC # FLD: 13.5 %
SODIUM SERPL-SCNC: 139 MMOL/L
T3 SERPL-MCNC: 74 NG/DL
T4 FREE SERPL-MCNC: 1.3 NG/DL
TIBC SERPL-MCNC: 313 UG/DL
TRIGL SERPL-MCNC: 147 MG/DL
TSH SERPL-ACNC: 2.35 UIU/ML
UIBC SERPL-MCNC: 210 UG/DL
VIT B12 SERPL-MCNC: 386 PG/ML
WBC # FLD AUTO: 10.15 K/UL

## 2018-10-26 ENCOUNTER — CLINICAL ADVICE (OUTPATIENT)
Age: 78
End: 2018-10-26

## 2018-10-30 ENCOUNTER — APPOINTMENT (OUTPATIENT)
Dept: INTERNAL MEDICINE | Facility: CLINIC | Age: 78
End: 2018-10-30
Payer: MEDICARE

## 2018-10-30 LAB
APPEARANCE: CLEAR
BACTERIA: NEGATIVE
BILIRUBIN URINE: NEGATIVE
BLOOD URINE: NEGATIVE
COLOR: YELLOW
CREAT SPEC-SCNC: 105 MG/DL
CREAT/PROT UR: 0.1 RATIO
GLUCOSE QUALITATIVE U: NEGATIVE MG/DL
KETONES URINE: NEGATIVE
LEUKOCYTE ESTERASE URINE: ABNORMAL
MICROSCOPIC-UA: NORMAL
NITRITE URINE: NEGATIVE
PH URINE: 5.5
PROT UR-MCNC: 8 MG/DL
PROTEIN URINE: NEGATIVE MG/DL
RED BLOOD CELLS URINE: 2 /HPF
SPECIFIC GRAVITY URINE: 1.02
SQUAMOUS EPITHELIAL CELLS: 5 /HPF
URINE COMMENTS: NORMAL
UROBILINOGEN URINE: 1 MG/DL
WHITE BLOOD CELLS URINE: 1 /HPF

## 2018-10-30 PROCEDURE — G0008: CPT

## 2018-10-30 PROCEDURE — 90662 IIV NO PRSV INCREASED AG IM: CPT

## 2018-10-30 NOTE — ASSESSMENT
[FreeTextEntry1] : Injection of Flu Vaccine, 0.5 ML, given intramuscularly in the left deltoid today.\par

## 2018-12-03 ENCOUNTER — APPOINTMENT (OUTPATIENT)
Dept: INTERNAL MEDICINE | Facility: CLINIC | Age: 78
End: 2018-12-03
Payer: MEDICARE

## 2018-12-03 ENCOUNTER — FORM ENCOUNTER (OUTPATIENT)
Age: 78
End: 2018-12-03

## 2018-12-03 VITALS
HEART RATE: 65 BPM | TEMPERATURE: 98.1 F | DIASTOLIC BLOOD PRESSURE: 80 MMHG | OXYGEN SATURATION: 93 % | BODY MASS INDEX: 28.68 KG/M2 | RESPIRATION RATE: 16 BRPM | SYSTOLIC BLOOD PRESSURE: 130 MMHG | WEIGHT: 168 LBS | HEIGHT: 64 IN

## 2018-12-03 PROCEDURE — 99214 OFFICE O/P EST MOD 30 MIN: CPT

## 2018-12-03 NOTE — HEALTH RISK ASSESSMENT
[No falls in past year] : Patient reported no falls in the past year [0] : 2) Feeling down, depressed, or hopeless: Not at all (0) [] : No [de-identified] : None [[id number]] : 0

## 2018-12-03 NOTE — ASSESSMENT
[FreeTextEntry1] : 78 year old female found to have stable Essential Hypertension, Type 2 Diabetes Mellitus with hyperglycemia, Hypercholesterolemia with Hypertriglyceridemia, Chronic Renal Failure stage -3,with the current regimen, diet and life style modifications, as counseled. Prior results reviewed and discussed with the patient during today's examination. Plan as ordered.\par Current symptoms are consistent with Right sided musculoskeletal pain, R/O Pleurisy/ Atypical PNA,  as counseled.

## 2018-12-03 NOTE — HISTORY OF PRESENT ILLNESS
[de-identified] : 78 year old  female patient with history of stable Essential Hypertension, Type 2 Diabetes Mellitus with hyperglycemia, Hypercholesterolemia with Hypertriglyceridemia, Chronic Renal Failure stage -3, history as stated, presented for follow up examination of Elevated BP checked and with c/o right sided chest pain, pleuritic, for 3-4 days, no fever or cough, improves with NSAID prn. Patient is compliant with all medications. Denies shortness of breath, chest pain or abdominal pains at this time. ROS as stated.\par

## 2018-12-04 ENCOUNTER — OUTPATIENT (OUTPATIENT)
Dept: OUTPATIENT SERVICES | Facility: HOSPITAL | Age: 78
LOS: 1 days | End: 2018-12-04
Payer: MEDICARE

## 2018-12-04 DIAGNOSIS — R07.89 OTHER CHEST PAIN: ICD-10-CM

## 2018-12-04 PROCEDURE — 71046 X-RAY EXAM CHEST 2 VIEWS: CPT

## 2018-12-04 PROCEDURE — 71046 X-RAY EXAM CHEST 2 VIEWS: CPT | Mod: 26

## 2019-01-04 ENCOUNTER — APPOINTMENT (OUTPATIENT)
Dept: NEPHROLOGY | Facility: CLINIC | Age: 79
End: 2019-01-04
Payer: MEDICARE

## 2019-01-04 VITALS — SYSTOLIC BLOOD PRESSURE: 152 MMHG | DIASTOLIC BLOOD PRESSURE: 71 MMHG | HEART RATE: 49 BPM

## 2019-01-04 VITALS
HEART RATE: 55 BPM | DIASTOLIC BLOOD PRESSURE: 79 MMHG | OXYGEN SATURATION: 97 % | HEIGHT: 64 IN | WEIGHT: 172 LBS | BODY MASS INDEX: 29.37 KG/M2 | SYSTOLIC BLOOD PRESSURE: 163 MMHG

## 2019-01-04 PROCEDURE — 99214 OFFICE O/P EST MOD 30 MIN: CPT

## 2019-01-04 NOTE — HISTORY OF PRESENT ILLNESS
[FreeTextEntry1] : Contacts:\par 	Dr. Harlan Edmond (PCP)\par 	Dr. Cata Swift (cardiology)\par 	cell, [phone]\par 	\par -------------------------------------------------------------------------------\par Problem List:\par 	Chronic kidney disease\par 		Stable CKD IIIb (SCr 1.5-1.7)\par 		Small amount of albumin-predominant proteinuria (200mg protein, 112mg albumin)\par 	Hypertension x20+ years, difficult to control at times\par 	Pre-diabetes mellitus\par 	Osteoarthritis, sciatica\par 	History of basal cell cancer\par \par -------------------------------------------------------------------------------\par HPI:\par Ms. Jim is a 78 year old woman with chronic kidney disease stage III, uncontrolled hypertension, pre-DM, borderline obesity, and osteroarthritis, here for hypertension and renal follow up.\par \par Last visit in 2018. No changes were made at that time. She has been following with Dr. Edmond, and had some dyspnea, which was thought to be pleurisy. She is now feeling fine. Due to concern for rising creatinine on most recent labs, her metformin was discontinued.\par \par She notes recently eating "a ton of [place] food". She is otherwise well, although a bit of lag in energy. Otherwise doing well and tolerating her medications withouth problems. Ms. Jim has no headache, lightheadedness, dizziness, sore throat, cough, dyspnea, chest pain, abdominal pain, diarrhea, constipation, dysuria, hematuria. She does get aches and pains in her feet/joints sometimes.\par \par ROS: All other systems were reviewed and are negative, except as per HPI.\par 	\par -------------------------------------------------------------------------------\par Social History:\par 	From [place], lives in [place], [place], for last 35+ years\par 	Retired 2016; worked as \par 	Lives with \par 	Former smoker, quit 30+ years ago; max several cigs per day\par 	Single alcoholic drink per day, on average\par \par Family History:\par 	Parents were first cousins\par 	Father had DM and  of 58 of diabetic complications (had heart attack); maternal grandmother had DM\par 	Mother had hypertension,  age 92\par 	Brother  of mesothelioma age 62\par 	No known history of renal disease, hematuria, proteinuria, ESRD, dialysis, transplant\par \par -------------------------------------------------------------------------------\par Allergies: Codeine, opioids, latex, pineapple, chocolate\par \par Medications:\par 	Amlodipine 5mg daily\par 	Atenolol 50mg BID\par 	Losartan 100mg daily\par 	Chlorthalidone 25mg daily\par 	Spironolactone 25mg daily\par 	\par 	Calcium citrate + D3 500mg + 400 units daily\par 	Aspirin 81mg daily\par 	Ketoconazole cream\par 	\par -------------------------------------------------------------------------------\par Physical Exam:\par 	Gen: NAD, well-appearing\par 	HEENT: Supple neck\par 	Pulm: CTA\par 	CV: RRR\par 	Back: No spinal or CVA terndeness\par 	Abd: +BS, soft, nontender/nondistended\par 	UE: Warm, FROM, intact strength\par 	LE: Warm, FROM, intact strength; trace bilateral edema\par 	Neuro: No focal deficits, intact gait\par 	Psych: Normal affect\par 	Skin: Warm, without rashes\par 	\par -------------------------------------------------------------------------------\par Labs/Studies:\par [Reviewed in [employer]] \par \par 	2018-10-24\par \par 		139 | 99 | 50\par 		-----------------< 120 Ca 9.9 eGFR 23\par 		4.4 | 24 | 2.01\par 		\par 		Albumin 4.3\par \par 	Creatinine Trend\par 		2018-10-24 SCr 2.01\par 		2018 SCr 1.69\par 		2018 SCr 1.58\par 		2018 SCr 1.59\par 		2018 SCr 1.56\par 		2018 SCr 1.92\par 		2017 SCr 1.76\par 		2017 SCr 1.74\par 		2017 SCr 1.67\par 		2016 SCr 1.27\par 		\par 	2018 U/A: protein negative, blood negative\par 	2018 UPC ratio = 0.1 g/g\par 	2017 UPC ratio = 0.2 g/g\par 	2017 UAC ratio = 112mg/g\par 		\par 	2018-10-24 CBC: WBC 10.2 / hgb 13.2 / plt 298\par 	2018-10-24 Lipid: chol 223, , HDL 39, \par 	2018-10-24 HbA1c 5.8\par 	2018-10-24 Vitamin D (25OH) 46.9\par 	 \par 	2018 Renin activity 0.174 ng/mL/hr\par 	2018 Aldosterone 14.8 \par

## 2019-01-04 NOTE — ASSESSMENT
[FreeTextEntry1] : Ms. Jim is a 78 year old woman with chronic kidney disease stage III, uncontrolled hypertension, pre-DM, borderline obesity, and osteroarthritis, here for hypertension and renal follow up.\par \par Ms. Jim has had generally stable CKD stage III ,although her most recent creatinine is elevated to 2. We will recheck today.\par \par Her blood pressure outside the office has been well-controlled, despite it being high today. She isn't surprised as her diet worsened around the holidays. I suspect it'll settle down, so will make no changes today.\par \par CKD III; hypertension; hyperkalemia\par - Cont. losartan, chlorthalidone, spironolactone, atenalol, amlodipine\par - Hyperkalemia very mild and controlled at this time\par \par Pre-DM, controlled\par - Cont. current meds\par \par Dyslipidemia with elevated LDL and TG: Defer to Dr. Swift and Dr. Edmond\par \par Cautioned to avoid regular NSAID use.\par \par \par F/u 3 months.\par Renewed all antihypertensives to [facility]\par Renal panel today.

## 2019-01-07 LAB
ALBUMIN SERPL ELPH-MCNC: 4.2 G/DL
ANION GAP SERPL CALC-SCNC: 13 MMOL/L
BUN SERPL-MCNC: 37 MG/DL
CALCIUM SERPL-MCNC: 10.4 MG/DL
CHLORIDE SERPL-SCNC: 103 MMOL/L
CO2 SERPL-SCNC: 25 MMOL/L
CREAT SERPL-MCNC: 1.73 MG/DL
GLUCOSE SERPL-MCNC: 126 MG/DL
PHOSPHATE SERPL-MCNC: 4 MG/DL
POTASSIUM SERPL-SCNC: 5.5 MMOL/L
SODIUM SERPL-SCNC: 141 MMOL/L

## 2019-01-22 ENCOUNTER — APPOINTMENT (OUTPATIENT)
Dept: INTERNAL MEDICINE | Facility: CLINIC | Age: 79
End: 2019-01-22

## 2019-02-05 ENCOUNTER — APPOINTMENT (OUTPATIENT)
Dept: INTERNAL MEDICINE | Facility: CLINIC | Age: 79
End: 2019-02-05
Payer: MEDICARE

## 2019-02-05 VITALS
WEIGHT: 173 LBS | HEART RATE: 61 BPM | BODY MASS INDEX: 29.53 KG/M2 | RESPIRATION RATE: 16 BRPM | TEMPERATURE: 97.1 F | SYSTOLIC BLOOD PRESSURE: 140 MMHG | OXYGEN SATURATION: 95 % | DIASTOLIC BLOOD PRESSURE: 80 MMHG | HEIGHT: 64 IN

## 2019-02-05 PROCEDURE — 99214 OFFICE O/P EST MOD 30 MIN: CPT

## 2019-02-05 NOTE — HISTORY OF PRESENT ILLNESS
[de-identified] : 79 year old  female patient with history of stable Essential Hypertension, Type 2 Diabetes Mellitus with hyperglycemia, Hypercholesterolemia with Hypertriglyceridemia, Chronic Renal Failure stage -3, Arthritis, history as stated, presented for follow up examination. Patient is compliant with all medications. Denies shortness of breath, chest pain or abdominal pains at this time. ROS as stated.\par

## 2019-02-05 NOTE — ASSESSMENT
[FreeTextEntry1] : 79 year old female found to have stable Essential Hypertension, Type 2 Diabetes Mellitus with hyperglycemia, Hypercholesterolemia with Hypertriglyceridemia, Chronic Renal Failure stage -3, Arthritis,with the current regimen, diet and life style modifications, as counseled. Prior results reviewed and discussed with the patient during today's examination. Plan as ordered.\par Patient was recently evaluated by [facility], findings and recommendations reviewed with the patient during today's examination.\par

## 2019-04-12 ENCOUNTER — APPOINTMENT (OUTPATIENT)
Dept: NEPHROLOGY | Facility: CLINIC | Age: 79
End: 2019-04-12
Payer: MEDICARE

## 2019-04-12 VITALS
OXYGEN SATURATION: 97 % | DIASTOLIC BLOOD PRESSURE: 77 MMHG | HEIGHT: 64 IN | BODY MASS INDEX: 30.11 KG/M2 | SYSTOLIC BLOOD PRESSURE: 152 MMHG | WEIGHT: 176.37 LBS | HEART RATE: 55 BPM

## 2019-04-12 VITALS — HEART RATE: 54 BPM | SYSTOLIC BLOOD PRESSURE: 148 MMHG | DIASTOLIC BLOOD PRESSURE: 74 MMHG

## 2019-04-12 PROCEDURE — 99215 OFFICE O/P EST HI 40 MIN: CPT

## 2019-04-12 NOTE — CONSULT LETTER
[Dear  ___] : Dear  [unfilled], [Courtesy Letter:] : I had the pleasure of seeing your patient, [unfilled], in my office today. [Please see my note below.] : Please see my note below. [Sincerely,] : Sincerely, [FreeTextEntry3] : Mohsen Guthrie MD\par Nephrology\par

## 2019-04-12 NOTE — ASSESSMENT
[FreeTextEntry1] : Ms. Jim is a 79 year old woman with chronic kidney disease stage IV with proteinuria, uncontrolled hypertension, pre-DM, borderline obesity, and osteroarthritis, here for hypertension and renal follow up.\par \par Ms. Jim has had generally stable CKD (now early stage IV) without proteinuria. Her blood pressure outside the office has been reportedly reasonably well-controlled, despite it being consistenlty high (or borderline high) in the office. \par \par CKD IV; hypertension; hyperkalemia\par - Cont. chlorthalidone, spironolactone, atenalol, amlodipine\par - STOP losartan; START telmistartan 80mg daily\par - Hyperkalemia very mild and controlled at this time\par \par Pre-DM, controlled\par - Cont. current meds\par \par Dyslipidemia with elevated LDL and TG: Discussed the likely need for a statin given persistent dyslipidemia, but Ms. Jim is going to work on her diet, weight, and exercise, and we mostly focused on BP control today\par \par Cautioned to avoid regular NSAID use. Discussed importance of regular physical activity; portion control and weight loss; and decrease in alcohol intake to 1 drink per day.\par \par \par Check labs today, follow up in 3 months.\par \par \par I have spent a total of 40 minutes in which >50% was spent in discussion with patient regarding chronic kidney disease, hypertension, diet (including counseling on salt and alcohol intake).

## 2019-04-12 NOTE — HISTORY OF PRESENT ILLNESS
[FreeTextEntry1] : Contacts:\par 	Dr. Harlan Edmond (PCP)\par 	cell, [phone]\par 	\par -------------------------------------------------------------------------------\par Problem List:\par 	Chronic kidney disease\par 		CKD IV (SCr 1.6-1.7)\par 		Small amount of albumin-predominant proteinuria (200mg protein, 112mg albumin)\par 	Hypertension x20+ years, difficult to control at times\par 	Pre-diabetes mellitus\par 	Osteoarthritis, sciatica\par 	History of basal cell cancer\par \par -------------------------------------------------------------------------------\par HPI:\par Ms. 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 No changes were made at that time. She has been following with Dr. Edmond Today is 56 anniversay; she is planning on going to an [place] restaurant for dinner.\par \par Her arthritis is bothering her, and she uses Aleve about once per week. Her diet has been "pretty rich" of late, and she is up to 2 drinks per day (Scotch). She "tries" to avoid desserts. Of note, her weight has been steadily increasing over the last year, from about 168 to now 176 lbs.\par \par BP at home, which she checks occasionally, has been systolic 130-140.\par \par She says she eats lots of vegetables (her  cooks at home, and makes lots of vegetables). She also walks once per day.\par \par Ms. Jim has no headache, lightheadedness, dizziness, sore throat, cough, dyspnea, chest pain, abdominal pain, diarrhea, constipation, dysuria, hematuria. She does get aches and pains in her feet/joints sometimes (arthritis).\par \par ROS: All other systems were reviewed and are negative, except as per HPI.\par 	\par -------------------------------------------------------------------------------\par Social History:\par 	From [place], lives in [place], [place], for last 35+ years\par 	Retired 2016; worked as \par 	Lives with \par 	Former smoker, quit 30+ years ago; max several cigs per day\par 	Single alcoholic drink per day, on average\par \par Family History:\par 	Parents were first cousins\par 	Father had DM and  of 58 of diabetic complications (had heart attack); maternal grandmother had DM\par 	Mother had hypertension,  age 92\par 	Brother  of mesothelioma age 62\par 	No known history of renal disease, hematuria, proteinuria, ESRD, dialysis, transplant\par \par -------------------------------------------------------------------------------\par Allergies: Codeine, opioids, latex, pineapple, chocolate\par \par Medications:\par 	Amlodipine 5mg daily (evening)\par 	Atenolol 50mg BID\par 	Losartan 100mg daily (evening)\par 	Chlorthalidone 25mg daily (morning)\par 	Spironolactone 25mg daily (morning)\par 	\par 	Calcium citrate + D3 500mg + 400 units daily\par 	Aspirin 81mg daily\par 	Ketoconazole cream\par 	\par -------------------------------------------------------------------------------\par Physical Exam:\par \par 	169/75, 55\par 	152/74, 54\par 	145/74, 55\par \par 	Gen: NAD, well-appearing\par 	HEENT: Supple neck\par 	Pulm: CTA\par 	CV: RRR\par 	Back: No spinal or CVA terndeness\par 	Abd: +BS, soft, nontender/nondistended\par 	UE: Warm, FROM, intact strength\par 	LE: Warm, FROM, intact strength; trace bilateral edema\par 	Neuro: No focal deficits, intact gait\par 	Psych: Normal affect\par 	Skin: Warm, without rashes\par 	\par -------------------------------------------------------------------------------\par Labs/Studies:\par [Reviewed in [employer]] \par \par 	2019\par \par 		141 | 103 | 37\par 		-----------------< 126 Ca 10.4 eGFR 28\par 		5.5 |  25 | 1.73\par 		\par 		Albumin 4.3\par \par 	Creatinine Trend\par 		2019 SCr 1.73\par 		2018-10-24 SCr 2.01\par 		2018 SCr 1.69\par 		2018 SCr 1.58\par 		2018 SCr 1.59\par 		2018 SCr 1.56\par 		2018 SCr 1.92\par 		2017 SCr 1.76\par 		2017 SCr 1.74\par 		2017 SCr 1.67\par 		2016 SCr 1.27\par 		\par 	2018 UPC ratio = 0.1 g/g\par 	2017 UPC ratio = 0.2 g/g\par 	2017 UAC ratio = 112mg/g\par 		\par 	2018 U/A: protein negative, blood negative\par \par 	2018-10-24 CBC: WBC 10.2 / hgb 13.2 / plt 298\par 	2018-10-24 Lipid: chol 223, , HDL 39, \par 	2018-10-24 HbA1c 5.8\par 	2018-10-24 Vitamin D (25OH) 46.9\par 	 \par 	2018 Renin activity 0.174 ng/mL/hr\par 	2018 Aldosterone 14.8 \par

## 2019-04-17 LAB
25(OH)D3 SERPL-MCNC: 32.7 NG/ML
ALBUMIN SERPL ELPH-MCNC: 4.3 G/DL
ANION GAP SERPL CALC-SCNC: 12 MMOL/L
APPEARANCE: ABNORMAL
BACTERIA: NEGATIVE
BASOPHILS # BLD AUTO: 0.06 K/UL
BASOPHILS NFR BLD AUTO: 0.6 %
BILIRUBIN URINE: NEGATIVE
BLOOD URINE: NEGATIVE
BUN SERPL-MCNC: 42 MG/DL
CALCIUM SERPL-MCNC: 9.9 MG/DL
CALCIUM SERPL-MCNC: 9.9 MG/DL
CHLORIDE SERPL-SCNC: 105 MMOL/L
CHOLEST SERPL-MCNC: 257 MG/DL
CHOLEST/HDLC SERPL: 5.4 RATIO
CO2 SERPL-SCNC: 23 MMOL/L
COLOR: YELLOW
CREAT SERPL-MCNC: 1.64 MG/DL
CREAT SPEC-SCNC: 122 MG/DL
EOSINOPHIL # BLD AUTO: 0.32 K/UL
EOSINOPHIL NFR BLD AUTO: 3.3 %
ESTIMATED AVERAGE GLUCOSE: 140 MG/DL
GLUCOSE QUALITATIVE U: NEGATIVE
GLUCOSE SERPL-MCNC: 124 MG/DL
HBA1C MFR BLD HPLC: 6.5 %
HCT VFR BLD CALC: 46.1 %
HDLC SERPL-MCNC: 48 MG/DL
HGB BLD-MCNC: 14.5 G/DL
HYALINE CASTS: 1 /LPF
IMM GRANULOCYTES NFR BLD AUTO: 0.3 %
KETONES URINE: NEGATIVE
LDLC SERPL CALC-MCNC: 175 MG/DL
LEUKOCYTE ESTERASE URINE: NEGATIVE
LYMPHOCYTES # BLD AUTO: 2.12 K/UL
LYMPHOCYTES NFR BLD AUTO: 21.9 %
MAN DIFF?: NORMAL
MCHC RBC-ENTMCNC: 31.5 GM/DL
MCHC RBC-ENTMCNC: 31.7 PG
MCV RBC AUTO: 100.7 FL
MICROALBUMIN 24H UR DL<=1MG/L-MCNC: <1.2 MG/DL
MICROALBUMIN/CREAT 24H UR-RTO: NORMAL MG/G
MICROSCOPIC-UA: NORMAL
MONOCYTES # BLD AUTO: 0.81 K/UL
MONOCYTES NFR BLD AUTO: 8.4 %
NEUTROPHILS # BLD AUTO: 6.35 K/UL
NEUTROPHILS NFR BLD AUTO: 65.5 %
NITRITE URINE: NEGATIVE
PARATHYROID HORMONE INTACT: 26 PG/ML
PH URINE: 5.5
PHOSPHATE SERPL-MCNC: 4.1 MG/DL
PLATELET # BLD AUTO: 261 K/UL
POTASSIUM SERPL-SCNC: 5.4 MMOL/L
PROTEIN URINE: NEGATIVE
RBC # BLD: 4.58 M/UL
RBC # FLD: 14 %
RED BLOOD CELLS URINE: 2 /HPF
SODIUM SERPL-SCNC: 140 MMOL/L
SPECIFIC GRAVITY URINE: 1.02
SQUAMOUS EPITHELIAL CELLS: 1 /HPF
TRIGL SERPL-MCNC: 170 MG/DL
TSH SERPL-ACNC: 1.81 UIU/ML
UROBILINOGEN URINE: NORMAL
WBC # FLD AUTO: 9.69 K/UL
WHITE BLOOD CELLS URINE: 1 /HPF

## 2019-05-13 ENCOUNTER — APPOINTMENT (OUTPATIENT)
Dept: INTERNAL MEDICINE | Facility: CLINIC | Age: 79
End: 2019-05-13
Payer: MEDICARE

## 2019-05-13 VITALS
WEIGHT: 175 LBS | HEART RATE: 58 BPM | OXYGEN SATURATION: 96 % | BODY MASS INDEX: 29.88 KG/M2 | HEIGHT: 64 IN | DIASTOLIC BLOOD PRESSURE: 80 MMHG | TEMPERATURE: 97.7 F | RESPIRATION RATE: 16 BRPM | SYSTOLIC BLOOD PRESSURE: 140 MMHG

## 2019-05-13 PROCEDURE — 99214 OFFICE O/P EST MOD 30 MIN: CPT

## 2019-05-13 NOTE — HEALTH RISK ASSESSMENT
[No falls in past year] : Patient reported no falls in the past year [0] : 2) Feeling down, depressed, or hopeless: Not at all (0) [] : No [[id number]] : 0 [de-identified] : NEPH

## 2019-05-13 NOTE — ASSESSMENT
[FreeTextEntry1] : 79 year old female found to have stable Essential Hypertension, Type 2 Diabetes Mellitus with hyperglycemia, Hypercholesterolemia with Hypertriglyceridemia, Chronic Renal Failure stage -3, Arthritis,with the current regimen, diet and life style modifications, as counseled. Prior results reviewed and discussed with the patient during today's examination. Plan as ordered.\par Patient was recently evaluated by [facility] , findings and recommendations reviewed with the patient during today's examination.\par

## 2019-05-13 NOTE — HISTORY OF PRESENT ILLNESS
[de-identified] : 79 year old  female patient with history of stable Essential Hypertension, Type 2 Diabetes Mellitus with hyperglycemia, Hypercholesterolemia with Hypertriglyceridemia, Chronic Renal Failure stage -3, Arthritis, history as stated, presented for follow up examination. Patient is compliant with all medications. Denies shortness of breath, chest pain or abdominal pains at this time. ROS as stated.\par

## 2019-09-23 ENCOUNTER — APPOINTMENT (OUTPATIENT)
Dept: INTERNAL MEDICINE | Facility: CLINIC | Age: 79
End: 2019-09-23
Payer: MEDICARE

## 2019-09-23 VITALS
RESPIRATION RATE: 16 BRPM | OXYGEN SATURATION: 95 % | WEIGHT: 179 LBS | BODY MASS INDEX: 30.56 KG/M2 | HEART RATE: 80 BPM | TEMPERATURE: 97.9 F | HEIGHT: 64 IN

## 2019-09-23 VITALS
BODY MASS INDEX: 30.56 KG/M2 | HEIGHT: 64 IN | OXYGEN SATURATION: 95 % | WEIGHT: 179 LBS | RESPIRATION RATE: 16 BRPM | SYSTOLIC BLOOD PRESSURE: 136 MMHG | HEART RATE: 80 BPM | DIASTOLIC BLOOD PRESSURE: 80 MMHG

## 2019-09-23 PROCEDURE — G0009: CPT

## 2019-09-23 PROCEDURE — 99214 OFFICE O/P EST MOD 30 MIN: CPT | Mod: 25

## 2019-09-23 PROCEDURE — 90732 PPSV23 VACC 2 YRS+ SUBQ/IM: CPT

## 2019-09-23 PROCEDURE — G0008: CPT

## 2019-09-23 PROCEDURE — 90662 IIV NO PRSV INCREASED AG IM: CPT

## 2019-09-23 NOTE — HEALTH RISK ASSESSMENT
[No] : No [No falls in past year] : Patient reported no falls in the past year [0] : 2) Feeling down, depressed, or hopeless: Not at all (0) [Independent] : managing finances [] : No [de-identified] : NEPH [[id number]] : 0

## 2019-09-23 NOTE — HISTORY OF PRESENT ILLNESS
[de-identified] : 79 year old  female patient with history of stable Essential Hypertension, Type 2 Diabetes Mellitus with hyperglycemia, Hypercholesterolemia with Hypertriglyceridemia, Chronic Renal Failure stage -3, Arthritis, history as stated, presented for follow up examination. Patient is compliant with all medications. Denies shortness of breath, chest pain or abdominal pains at this time. ROS as stated.\par

## 2019-09-23 NOTE — ASSESSMENT
[FreeTextEntry1] : 79 year old female found to have stable Essential Hypertension, Type 2 Diabetes Mellitus with hyperglycemia, Hypercholesterolemia with Hypertriglyceridemia, Chronic Renal Failure stage -3, Arthritis,with the current regimen, diet and life style modifications, as counseled. Prior results reviewed and discussed with the patient during today's examination. Plan as ordered.\par

## 2019-11-04 ENCOUNTER — LABORATORY RESULT (OUTPATIENT)
Age: 79
End: 2019-11-04

## 2019-11-05 LAB
25(OH)D3 SERPL-MCNC: 52.9 NG/ML
ALBUMIN SERPL ELPH-MCNC: 4.4 G/DL
ALP BLD-CCNC: 39 U/L
ALT SERPL-CCNC: 9 U/L
ANION GAP SERPL CALC-SCNC: 16 MMOL/L
APPEARANCE: ABNORMAL
AST SERPL-CCNC: 9 U/L
BASOPHILS # BLD AUTO: 0.1 K/UL
BASOPHILS NFR BLD AUTO: 0.9 %
BILIRUB SERPL-MCNC: 0.4 MG/DL
BILIRUBIN URINE: NEGATIVE
BLOOD URINE: NEGATIVE
BUN SERPL-MCNC: 74 MG/DL
CALCIUM SERPL-MCNC: 10.5 MG/DL
CHLORIDE SERPL-SCNC: 104 MMOL/L
CHOLEST SERPL-MCNC: 251 MG/DL
CHOLEST/HDLC SERPL: 6.4 RATIO
CO2 SERPL-SCNC: 20 MMOL/L
COLOR: NORMAL
CREAT SERPL-MCNC: 2.7 MG/DL
CREAT SPEC-SCNC: 107 MG/DL
EOSINOPHIL # BLD AUTO: 0.35 K/UL
EOSINOPHIL NFR BLD AUTO: 3.3 %
ERYTHROCYTE [SEDIMENTATION RATE] IN BLOOD BY WESTERGREN METHOD: 18 MM/HR
ESTIMATED AVERAGE GLUCOSE: 131 MG/DL
FOLATE SERPL-MCNC: 11.1 NG/ML
GGT SERPL-CCNC: 19 U/L
GLUCOSE QUALITATIVE U: NEGATIVE
GLUCOSE SERPL-MCNC: 144 MG/DL
HBA1C MFR BLD HPLC: 6.2 %
HCT VFR BLD CALC: 45.2 %
HDLC SERPL-MCNC: 39 MG/DL
HEMOCCULT STL QL IA: NEGATIVE
HGB BLD-MCNC: 14.5 G/DL
IMM GRANULOCYTES NFR BLD AUTO: 0.4 %
IRON SATN MFR SERPL: 31 %
IRON SERPL-MCNC: 95 UG/DL
KETONES URINE: NEGATIVE
LDLC SERPL CALC-MCNC: 181 MG/DL
LEUKOCYTE ESTERASE URINE: NEGATIVE
LYMPHOCYTES # BLD AUTO: 2.99 K/UL
LYMPHOCYTES NFR BLD AUTO: 28.1 %
MAN DIFF?: NORMAL
MCHC RBC-ENTMCNC: 32.1 GM/DL
MCHC RBC-ENTMCNC: 32.7 PG
MCV RBC AUTO: 102 FL
MICROALBUMIN 24H UR DL<=1MG/L-MCNC: <1.2 MG/DL
MICROALBUMIN/CREAT 24H UR-RTO: NORMAL MG/G
MONOCYTES # BLD AUTO: 1.09 K/UL
MONOCYTES NFR BLD AUTO: 10.2 %
NEUTROPHILS # BLD AUTO: 6.08 K/UL
NEUTROPHILS NFR BLD AUTO: 57.1 %
NITRITE URINE: NEGATIVE
PH URINE: 5.5
PLATELET # BLD AUTO: 276 K/UL
POTASSIUM SERPL-SCNC: 5.3 MMOL/L
PROT SERPL-MCNC: 7.3 G/DL
PROTEIN URINE: NEGATIVE
RBC # BLD: 4.43 M/UL
RBC # FLD: 13.2 %
SODIUM SERPL-SCNC: 140 MMOL/L
SPECIFIC GRAVITY URINE: 1.02
T3 SERPL-MCNC: 73 NG/DL
T4 FREE SERPL-MCNC: 1.2 NG/DL
TIBC SERPL-MCNC: 304 UG/DL
TRIGL SERPL-MCNC: 153 MG/DL
TSH SERPL-ACNC: 2.11 UIU/ML
UIBC SERPL-MCNC: 209 UG/DL
UROBILINOGEN URINE: NORMAL
VIT B12 SERPL-MCNC: 396 PG/ML
WBC # FLD AUTO: 10.65 K/UL

## 2019-12-16 ENCOUNTER — APPOINTMENT (OUTPATIENT)
Dept: INTERNAL MEDICINE | Facility: CLINIC | Age: 79
End: 2019-12-16
Payer: MEDICARE

## 2019-12-16 ENCOUNTER — NON-APPOINTMENT (OUTPATIENT)
Age: 79
End: 2019-12-16

## 2019-12-16 VITALS
OXYGEN SATURATION: 97 % | WEIGHT: 180 LBS | BODY MASS INDEX: 30.73 KG/M2 | SYSTOLIC BLOOD PRESSURE: 130 MMHG | DIASTOLIC BLOOD PRESSURE: 80 MMHG | HEIGHT: 64 IN | HEART RATE: 89 BPM | RESPIRATION RATE: 16 BRPM

## 2019-12-16 PROCEDURE — 99214 OFFICE O/P EST MOD 30 MIN: CPT | Mod: 25

## 2019-12-16 PROCEDURE — 93000 ELECTROCARDIOGRAM COMPLETE: CPT

## 2019-12-16 NOTE — ASSESSMENT
[FreeTextEntry1] : 79 year old female found to have stable Essential Hypertension, Type 2 Diabetes Mellitus with hyperglycemia, Hypercholesterolemia with Hypertriglyceridemia, Chronic Renal Failure stage -3, Arthritis,with the current regimen, diet and life style modifications, as counseled. Prior results reviewed and discussed with the patient during today's examination. Plan as ordered.\par EKG showed new onset Atrial Fibrillation with the rate of 92 BPM, non-sp ST-T changes noted.\par

## 2019-12-16 NOTE — HEALTH RISK ASSESSMENT
[No] : In the past 12 months have you used drugs other than those required for medical reasons? No [No falls in past year] : Patient reported no falls in the past year [0] : 2) Feeling down, depressed, or hopeless: Not at all (0) [] : No [de-identified] : None [[id number]] : 0

## 2019-12-16 NOTE — HISTORY OF PRESENT ILLNESS
[de-identified] : 79 year old  female patient with history of stable Essential Hypertension, Type 2 Diabetes Mellitus with hyperglycemia, Hypercholesterolemia with Hypertriglyceridemia, Chronic Renal Failure stage -3, Arthritis, history as stated, presented for follow up examination. Patient is compliant with all medications. Denies shortness of breath, chest pain or abdominal pains at this time. ROS as stated.\par

## 2019-12-30 ENCOUNTER — RX RENEWAL (OUTPATIENT)
Age: 79
End: 2019-12-30

## 2020-01-02 ENCOUNTER — RX RENEWAL (OUTPATIENT)
Age: 80
End: 2020-01-02

## 2020-01-03 ENCOUNTER — APPOINTMENT (OUTPATIENT)
Dept: NEPHROLOGY | Facility: CLINIC | Age: 80
End: 2020-01-03
Payer: MEDICARE

## 2020-01-03 VITALS
OXYGEN SATURATION: 97 % | BODY MASS INDEX: 31.41 KG/M2 | SYSTOLIC BLOOD PRESSURE: 139 MMHG | WEIGHT: 184 LBS | HEIGHT: 64 IN | DIASTOLIC BLOOD PRESSURE: 87 MMHG | HEART RATE: 96 BPM

## 2020-01-03 DIAGNOSIS — E87.5 HYPERKALEMIA: ICD-10-CM

## 2020-01-03 DIAGNOSIS — R80.9 PROTEINURIA, UNSPECIFIED: ICD-10-CM

## 2020-01-03 PROCEDURE — 99215 OFFICE O/P EST HI 40 MIN: CPT

## 2020-01-03 NOTE — ASSESSMENT
[FreeTextEntry1] : Ms. Jim is a 79 year old woman with chronic kidney disease stage IV with proteinuria, uncontrolled hypertension, pre-DM, borderline obesity, and osteroarthritis, here for hypertension and renal follow up.\par \par Ms. Jim has had reasonably stable CKD, although her last chemistry showed quite a bump in her creatinine. Her proteinuria has been reduced to nill for now and her blood pressure is much better. I am not sure what to make of the most recent creatinine -- how much is injurious vs. hemodynamic effects -- so we will recheck it today prior to making any changes to her regimen.\par \par We also discussed her dyslipidemia, and while I think she should be on a statin, I will defer (and she asked to defer it) to Dr. Edmond and to her cardiologist.\par \par CKD IV; hypertension; hyperkalemia\par - Cont. chlorthalidone, spironolactone, atenalol, amlodipine\par - Cont. telmistartan 80mg daily for now; recheck renal function today\par - Hyperkalemia very mild and controlled at this time\par \par Pre-DM, controlled\par - Cont. current meds\par \par Dyslipidemia with elevated LDL and TG: Recommend statin\par \par Afib: Cont. apixaban\par \par Cautioned to avoid regular NSAID use. Discussed importance of regular physical activity; portion control and weight loss; and decrease in alcohol intake to 1 drink per day.\par \par \par Check chemistry today.\par Follow up in 3 months.\par \par \par I have spent a total of 40 minutes in which >50% was spent in discussion with patient regarding chronic kidney disease and hypertension.

## 2020-01-03 NOTE — HISTORY OF PRESENT ILLNESS
[FreeTextEntry1] : Contacts:\par 	Dr. Harlan Edmond (PCP)\par 	cell, [phone]\par 	\par -------------------------------------------------------------------------------\par Problem List:\par 	Chronic kidney disease\par 		CKD IV (SCr 1.7)\par 		Small amount of albumin-predominant proteinuria (200mg protein, 112mg albumin); most recently negative (2019)\par 	Hypertension x20+ years, difficult to control at times\par 	Pre-diabetes mellitus\par 	Osteoarthritis, sciatica\par 	History of basal cell cancer\par \par -------------------------------------------------------------------------------\par HPI:\par Ms. 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 At that last visit, we switched losartan to telmisartan 80mg daily. We also discussed potential use of statin, but Ms. Jim opted to try lifestyle modifications. She has since followed with Dr. Edmond, and at her most recent visit in 2019, she was found to have new atrial fibrillation, for which she was referred to cardiology (has appt. with Dr. Matta on ). She was started on apixaban (Eliquis) 5mg BID. (She senses her irregularity in her heart rate at night, while lying in bed.)\par \par Of note, a 2019 blood test revealed a creatinine of 2.7, up from a previous baseline of about 1.7.\par \par Ms. Jim has no headache, lightheadedness, dizziness, sore throat, cough, dyspnea, chest pain, abdominal pain, diarrhea, constipation, dysuria, hematuria. She does get aches and pains in her feet/joints sometimes (arthritis).\par \par ROS: All other systems were reviewed and are negative, except as per HPI.\par 	\par -------------------------------------------------------------------------------\par Social History:\par 	From [place], lives in [place], [place], for last 35+ years\par 	Retired 2016; worked as \par 	Lives with \par 	Former smoker, quit 30+ years ago; max several cigs per day\par 	Single alcoholic drink per day, on average\par \par Family History:\par 	Parents were first cousins\par 	Father had DM and  of 58 of diabetic complications (had heart attack); maternal grandmother had DM\par 	Mother had hypertension,  age 92\par 	Brother  of mesothelioma age 62\par 	No known history of renal disease, hematuria, proteinuria, ESRD, dialysis, transplant\par \par -------------------------------------------------------------------------------\par Allergies: Codeine, opioids, latex, pineapple, chocolate\par \par Medications:\par 	Amlodipine 5mg daily (evening)\par 	Atenolol 50mg BID\par 	Telmisartan 80mg daily (evening)\par 	Chlorthalidone 25mg daily (morning)\par 	Spironolactone 25mg daily (morning)\par 	\par 	Apixaban (Eliquis) 5mg BID\par 	Calcium citrate + D3 500mg + 400 units daily\par 	Aspirin 81mg daily\par 	Ketoconazole cream\par 	\par -------------------------------------------------------------------------------\par Physical Exam:\par \par 	Gen: NAD, well-appearing\par 	HEENT: Supple neck\par 	Pulm: CTA\par 	CV: Irregular\par 	Back: No spinal or CVA terndeness\par 	Abd: +BS, soft, nontender/nondistended\par 	UE: Warm, FROM, intact strength\par 	LE: Warm, FROM, intact strength; trace bilateral edema\par 	Neuro: No focal deficits, intact gait\par 	Psych: Normal affect\par 	Skin: Warm, without rashes\par 	\par -------------------------------------------------------------------------------\par Labs/Studies:\par [Reviewed in [employer]] \par \par 	2019\par \par 		140 | 104 | 74\par 		-----------------< 144 Ca 10.5 eGFR 16\par 		5.3 |  20 | 2.7\par 		\par 		Albumin 4.4\par \par 	Creatinine Trend\par 		2019 SCr 2.7\par 		2019 SCr 1.64\par 		2019 SCr 1.73\par 		2018-10-24 SCr 2.01\par 		2018 SCr 1.69\par 		2018 SCr 1.58\par 		2018 SCr 1.59\par 		2018 SCr 1.56\par 		2018 SCr 1.92\par 		2017 SCr 1.76\par 		2017 SCr 1.74\par 		2017 SCr 1.67\par 		2016 SCr 1.27\par 		\par 	2018 UPC ratio = 0.1 g/g\par 	2017 UPC ratio = 0.2 g/g\par 	\par 	2019 UAC ratio = negative\par 	2019 UAC ratio = negative\par 	2017 UAC ratio = 112mg/g\par 		\par 	2019 U/A: protein negative, blood negative; RBC 5, WBC 1\par \par 	2019 CBC: WBC 10.7 / hgb 14.5 / plt 276\par 	2019 Lipid: chol 251, , HDL 39, \par 	2019 HbA1c 6.2\par 	2019 Vitamin D (25OH) 52.9\par 	 \par 	2018 Renin activity 0.174 ng/mL/hr\par 	2018 Aldosterone 14.8\par

## 2020-01-14 LAB
ALBUMIN SERPL ELPH-MCNC: 4.7 G/DL
ANION GAP SERPL CALC-SCNC: 13 MMOL/L
BUN SERPL-MCNC: 51 MG/DL
CALCIUM SERPL-MCNC: 11.6 MG/DL
CHLORIDE SERPL-SCNC: 101 MMOL/L
CO2 SERPL-SCNC: 25 MMOL/L
CREAT SERPL-MCNC: 1.9 MG/DL
GLUCOSE SERPL-MCNC: 137 MG/DL
PHOSPHATE SERPL-MCNC: 4 MG/DL
POTASSIUM SERPL-SCNC: 5.3 MMOL/L
SODIUM SERPL-SCNC: 139 MMOL/L

## 2020-02-26 LAB
ALBUMIN SERPL ELPH-MCNC: 4.4 G/DL
ALP BLD-CCNC: 41 U/L
ALT SERPL-CCNC: 9 U/L
ANION GAP SERPL CALC-SCNC: 14 MMOL/L
AST SERPL-CCNC: 9 U/L
BASOPHILS # BLD AUTO: 0.11 K/UL
BASOPHILS NFR BLD AUTO: 1.1 %
BILIRUB SERPL-MCNC: 0.7 MG/DL
BUN SERPL-MCNC: 50 MG/DL
CALCIUM SERPL-MCNC: 10.1 MG/DL
CHLORIDE SERPL-SCNC: 104 MMOL/L
CHOLEST SERPL-MCNC: 248 MG/DL
CHOLEST/HDLC SERPL: 5.9 RATIO
CO2 SERPL-SCNC: 22 MMOL/L
CREAT SERPL-MCNC: 1.84 MG/DL
EOSINOPHIL # BLD AUTO: 0.2 K/UL
EOSINOPHIL NFR BLD AUTO: 2.1 %
ESTIMATED AVERAGE GLUCOSE: 143 MG/DL
GGT SERPL-CCNC: 18 U/L
GLUCOSE SERPL-MCNC: 139 MG/DL
HBA1C MFR BLD HPLC: 6.6 %
HCT VFR BLD CALC: 46 %
HDLC SERPL-MCNC: 42 MG/DL
HGB BLD-MCNC: 14.9 G/DL
IMM GRANULOCYTES NFR BLD AUTO: 0.4 %
LDLC SERPL CALC-MCNC: 177 MG/DL
LYMPHOCYTES # BLD AUTO: 2.59 K/UL
LYMPHOCYTES NFR BLD AUTO: 26.6 %
MAN DIFF?: NORMAL
MCHC RBC-ENTMCNC: 32.4 GM/DL
MCHC RBC-ENTMCNC: 33 PG
MCV RBC AUTO: 101.8 FL
MONOCYTES # BLD AUTO: 0.84 K/UL
MONOCYTES NFR BLD AUTO: 8.6 %
NEUTROPHILS # BLD AUTO: 5.97 K/UL
NEUTROPHILS NFR BLD AUTO: 61.2 %
PLATELET # BLD AUTO: 255 K/UL
POTASSIUM SERPL-SCNC: 5.2 MMOL/L
PROT SERPL-MCNC: 7.1 G/DL
RBC # BLD: 4.52 M/UL
RBC # FLD: 13.5 %
SODIUM SERPL-SCNC: 141 MMOL/L
T3 SERPL-MCNC: 73 NG/DL
T4 FREE SERPL-MCNC: 1.2 NG/DL
TRIGL SERPL-MCNC: 145 MG/DL
TSH SERPL-ACNC: 1.7 UIU/ML
WBC # FLD AUTO: 9.75 K/UL

## 2020-02-27 ENCOUNTER — APPOINTMENT (OUTPATIENT)
Dept: INTERNAL MEDICINE | Facility: CLINIC | Age: 80
End: 2020-02-27
Payer: MEDICARE

## 2020-02-27 VITALS
TEMPERATURE: 97.4 F | HEART RATE: 92 BPM | DIASTOLIC BLOOD PRESSURE: 82 MMHG | SYSTOLIC BLOOD PRESSURE: 123 MMHG | WEIGHT: 179 LBS | HEIGHT: 64 IN | BODY MASS INDEX: 30.56 KG/M2 | OXYGEN SATURATION: 95 % | RESPIRATION RATE: 16 BRPM

## 2020-02-27 PROCEDURE — 99214 OFFICE O/P EST MOD 30 MIN: CPT

## 2020-02-27 NOTE — ASSESSMENT
[FreeTextEntry1] : 80 year old female found to have stable Essential Hypertension, Type 2 Diabetes Mellitus with hyperglycemia, Hypercholesterolemia with Hypertriglyceridemia, Chronic Renal Failure stage -3, Arthritis,with the current regimen, diet and life style modifications, as counseled. Prior results reviewed and discussed with the patient during today's examination. Plan as ordered.\par

## 2020-02-27 NOTE — HEALTH RISK ASSESSMENT
[No] : In the past 12 months have you used drugs other than those required for medical reasons? No [No falls in past year] : Patient reported no falls in the past year [0] : 2) Feeling down, depressed, or hopeless: Not at all (0) [de-identified] : NEPH [] : No [[id number]] : 0

## 2020-02-27 NOTE — HISTORY OF PRESENT ILLNESS
[de-identified] : 80 year old  female patient with history of stable Essential Hypertension, Type 2 Diabetes Mellitus with hyperglycemia, Hypercholesterolemia with Hypertriglyceridemia, Chronic Renal Failure stage -3, Arthritis, history as stated, presented for follow up examination. Patient is compliant with all medications. Denies shortness of breath, chest pain or abdominal pains at this time. ROS as stated.\par

## 2020-03-17 ENCOUNTER — APPOINTMENT (OUTPATIENT)
Dept: CARDIOLOGY | Facility: CLINIC | Age: 80
End: 2020-03-17

## 2020-06-22 ENCOUNTER — APPOINTMENT (OUTPATIENT)
Dept: INTERNAL MEDICINE | Facility: CLINIC | Age: 80
End: 2020-06-22

## 2021-02-09 ENCOUNTER — APPOINTMENT (OUTPATIENT)
Dept: INTERNAL MEDICINE | Facility: CLINIC | Age: 81
End: 2021-02-09
Payer: MEDICARE

## 2021-02-09 PROCEDURE — 99442: CPT | Mod: 95

## 2021-02-09 NOTE — HISTORY OF PRESENT ILLNESS
[Home] : at home, [unfilled] , at the time of the visit. [Medical Office: ([facility])___] : at the medical office located in  [Verbal consent obtained from patient] : the patient, [unfilled] [FreeTextEntry4] : RAMOS Boone [de-identified] : 81 year female  patient with history of stable Essential Hypertension, Type 2 Diabetes Mellitus with hyperglycemia, Hypercholesterolemia with Hypertriglyceridemia, Chronic Renal Failure stage -3, Arthritis, history as stated, initiated telephonic visit for Disease Management and Medication Adherence.\par

## 2021-02-09 NOTE — ASSESSMENT
[FreeTextEntry1] : Time spent for this encounter : 15  minutes.\par More than 50 % of the time spent for - Disease Management and Medication Adherence.\par \par 81 year F patient found to have stable Essential Hypertension, Type 2 Diabetes Mellitus with hyperglycemia, Hypercholesterolemia with Hypertriglyceridemia, Chronic Renal Failure stage -3, Arthritis,,with the current regimen, diet and life style modifications, as counseled. Prior results reviewed and discussed with the patient during today's Telephonic encounter. Plan as ordered.\par \par \par Patient granted verbal permission to provide Telephonic/Tele Health service in reference to today's encounter, as a substitute form of a visit, for a standard office visit encounter in the phase of an ongoing Pandemic / Covid -19, with the awareness and acceptance of a possible limited nature of such an encounter, with a possibility of an inadvertent omission of possible findings and misleading treatment options, due to possible erroneous and/or incomplete diagnostic impressions.\par

## 2021-04-27 ENCOUNTER — APPOINTMENT (OUTPATIENT)
Dept: INTERNAL MEDICINE | Facility: CLINIC | Age: 81
End: 2021-04-27

## 2021-06-03 ENCOUNTER — APPOINTMENT (OUTPATIENT)
Dept: INTERNAL MEDICINE | Facility: CLINIC | Age: 81
End: 2021-06-03
Payer: MEDICARE

## 2021-06-03 ENCOUNTER — NON-APPOINTMENT (OUTPATIENT)
Age: 81
End: 2021-06-03

## 2021-06-03 VITALS
WEIGHT: 183 LBS | HEIGHT: 64 IN | HEART RATE: 107 BPM | BODY MASS INDEX: 31.24 KG/M2 | RESPIRATION RATE: 16 BRPM | OXYGEN SATURATION: 95 % | DIASTOLIC BLOOD PRESSURE: 80 MMHG | TEMPERATURE: 97.8 F | SYSTOLIC BLOOD PRESSURE: 149 MMHG

## 2021-06-03 DIAGNOSIS — A63.0 ANOGENITAL (VENEREAL) WARTS: ICD-10-CM

## 2021-06-03 PROCEDURE — 99214 OFFICE O/P EST MOD 30 MIN: CPT | Mod: 25

## 2021-06-03 PROCEDURE — 93000 ELECTROCARDIOGRAM COMPLETE: CPT

## 2021-06-03 NOTE — ASSESSMENT
[FreeTextEntry1] : 81 year old female found to have stable Essential Hypertension, Type 2 Diabetes Mellitus with hyperglycemia, Hypercholesterolemia with Hypertriglyceridemia, Chronic Renal Failure stage -3, Arthritis,with the current prescription regimen as recommended, diet and life style modifications, as counseled. Prior results reviewed, interpreted and discussed with the patient during today's examination, as appropriate. Follow up, treatment plan and tests, as ordered.\par \par EKG showed known Atrial Fibrillation with MVR, non-sp ST-T changes noted.\par \par Total time spent : 30 minutes\par Including:\par Preparation prior to visit - Reviewing prior record, results of tests and Consultation Reports as applicable\par Conducting an appropriate H & P during today's encounter\par Appropriate orders for tests, medications and procedures, as applicable\par Counseling patient \par Note completion\par

## 2021-06-03 NOTE — HISTORY OF PRESENT ILLNESS
[de-identified] : 81 year old  female patient with history of stable Essential Hypertension, Type 2 Diabetes Mellitus with hyperglycemia, Hypercholesterolemia with Hypertriglyceridemia, Chronic Renal Failure stage -3, Arthritis, history as stated, presented for follow up examination. Patient is compliant with all medications. Denies shortness of breath, chest pain or abdominal pains at this time. ROS as stated.\par

## 2021-06-03 NOTE — HEALTH RISK ASSESSMENT
[No] : In the past 12 months have you used drugs other than those required for medical reasons? No [No falls in past year] : Patient reported no falls in the past year [0] : 2) Feeling down, depressed, or hopeless: Not at all (0) [] : No [de-identified] : None [[id number]] : 0

## 2021-06-10 ENCOUNTER — LABORATORY RESULT (OUTPATIENT)
Age: 81
End: 2021-06-10

## 2021-06-12 LAB
25(OH)D3 SERPL-MCNC: 55.9 NG/ML
ALBUMIN SERPL ELPH-MCNC: 4.6 G/DL
ALP BLD-CCNC: 44 U/L
ALT SERPL-CCNC: 11 U/L
ANION GAP SERPL CALC-SCNC: 14 MMOL/L
APPEARANCE: ABNORMAL
AST SERPL-CCNC: 9 U/L
BASOPHILS # BLD AUTO: 0.1 K/UL
BASOPHILS NFR BLD AUTO: 0.9 %
BILIRUB SERPL-MCNC: 0.4 MG/DL
BILIRUBIN URINE: NEGATIVE
BLOOD URINE: NEGATIVE
BUN SERPL-MCNC: 44 MG/DL
CALCIUM SERPL-MCNC: 9.8 MG/DL
CHLORIDE SERPL-SCNC: 107 MMOL/L
CHOLEST SERPL-MCNC: 217 MG/DL
CO2 SERPL-SCNC: 20 MMOL/L
COLOR: YELLOW
CREAT SERPL-MCNC: 1.88 MG/DL
CREAT SPEC-SCNC: 244 MG/DL
EOSINOPHIL # BLD AUTO: 0.26 K/UL
EOSINOPHIL NFR BLD AUTO: 2.3 %
ERYTHROCYTE [SEDIMENTATION RATE] IN BLOOD BY WESTERGREN METHOD: 22 MM/HR
ESTIMATED AVERAGE GLUCOSE: 148 MG/DL
FOLATE SERPL-MCNC: 7.3 NG/ML
GGT SERPL-CCNC: 21 U/L
GLUCOSE QUALITATIVE U: NEGATIVE
GLUCOSE SERPL-MCNC: 149 MG/DL
HBA1C MFR BLD HPLC: 6.8 %
HCT VFR BLD CALC: 48 %
HDLC SERPL-MCNC: 40 MG/DL
HGB BLD-MCNC: 15.1 G/DL
IMM GRANULOCYTES NFR BLD AUTO: 0.4 %
IRON SATN MFR SERPL: 20 %
IRON SERPL-MCNC: 62 UG/DL
KETONES URINE: NORMAL
LDLC SERPL CALC-MCNC: 156 MG/DL
LEUKOCYTE ESTERASE URINE: ABNORMAL
LYMPHOCYTES # BLD AUTO: 2.13 K/UL
LYMPHOCYTES NFR BLD AUTO: 19 %
MAN DIFF?: NORMAL
MCHC RBC-ENTMCNC: 31.5 GM/DL
MCHC RBC-ENTMCNC: 32.5 PG
MCV RBC AUTO: 103.2 FL
MICROALBUMIN 24H UR DL<=1MG/L-MCNC: 11.8 MG/DL
MICROALBUMIN/CREAT 24H UR-RTO: 48 MG/G
MONOCYTES # BLD AUTO: 1.02 K/UL
MONOCYTES NFR BLD AUTO: 9.1 %
NEUTROPHILS # BLD AUTO: 7.67 K/UL
NEUTROPHILS NFR BLD AUTO: 68.3 %
NITRITE URINE: NEGATIVE
NONHDLC SERPL-MCNC: 177 MG/DL
PH URINE: 5.5
PLATELET # BLD AUTO: 285 K/UL
POTASSIUM SERPL-SCNC: 5.1 MMOL/L
PROT SERPL-MCNC: 7.1 G/DL
PROTEIN URINE: ABNORMAL
RBC # BLD: 4.65 M/UL
RBC # FLD: 13.2 %
SODIUM SERPL-SCNC: 141 MMOL/L
SPECIFIC GRAVITY URINE: 1.02
T3 SERPL-MCNC: 88 NG/DL
T4 FREE SERPL-MCNC: 1.1 NG/DL
TIBC SERPL-MCNC: 306 UG/DL
TRIGL SERPL-MCNC: 105 MG/DL
TSH SERPL-ACNC: 2.4 UIU/ML
UIBC SERPL-MCNC: 244 UG/DL
UROBILINOGEN URINE: ABNORMAL
VIT B12 SERPL-MCNC: 277 PG/ML
WBC # FLD AUTO: 11.23 K/UL

## 2021-08-16 ENCOUNTER — APPOINTMENT (OUTPATIENT)
Dept: CARDIOLOGY | Facility: CLINIC | Age: 81
End: 2021-08-16
Payer: MEDICARE

## 2021-08-16 VITALS
RESPIRATION RATE: 16 BRPM | BODY MASS INDEX: 31.24 KG/M2 | TEMPERATURE: 95.5 F | DIASTOLIC BLOOD PRESSURE: 85 MMHG | HEIGHT: 64 IN | WEIGHT: 183 LBS | OXYGEN SATURATION: 93 % | SYSTOLIC BLOOD PRESSURE: 129 MMHG | HEART RATE: 96 BPM

## 2021-08-16 PROCEDURE — 99204 OFFICE O/P NEW MOD 45 MIN: CPT

## 2021-08-23 ENCOUNTER — OUTPATIENT (OUTPATIENT)
Dept: OUTPATIENT SERVICES | Facility: HOSPITAL | Age: 81
LOS: 1 days | End: 2021-08-23

## 2021-08-23 DIAGNOSIS — I48.91 UNSPECIFIED ATRIAL FIBRILLATION: ICD-10-CM

## 2021-08-31 ENCOUNTER — OUTPATIENT (OUTPATIENT)
Dept: OUTPATIENT SERVICES | Facility: HOSPITAL | Age: 81
LOS: 1 days | End: 2021-08-31
Payer: MEDICARE

## 2021-08-31 DIAGNOSIS — I48.91 UNSPECIFIED ATRIAL FIBRILLATION: ICD-10-CM

## 2021-08-31 PROCEDURE — 93306 TTE W/DOPPLER COMPLETE: CPT

## 2021-08-31 PROCEDURE — 93306 TTE W/DOPPLER COMPLETE: CPT | Mod: 26

## 2021-09-02 NOTE — ADDENDUM
[FreeTextEntry1] : ADDENDUM 9/2: Echo from 8/31 showed preserved EF with mild LVH, tethered MV leaflets with moderate MR. Will D/W patient at her next visit.

## 2021-09-02 NOTE — END OF VISIT
[] : Resident [FreeTextEntry3] : 81 F with HTN, CKD, and permanent A Fib (on Eliquis since 2019) who presents for initial evaluation. Patient is asymptomatic and euvolemic on exam. \par 1. A fib: Will send for baseline echocardiogram to assess EF/LV size\par -Patient's CHADS2-VASc score is 4 (HTN, age+2, female ), corresponding to ~5% annual risk of stroke if off systemic anticoagulation. On renally dosed Eliquis (age > 80, creatinine > 1.5). R/B/A discussed. \par \par -No indication for rhythm control since patient is asymptomatic and has no HF; will reassess periodically \par -Rate controlled on atenolol 25mg PO BID (renally dosed). Target HR <100 bpm\par \par 2. HTN: Well controlled on current regimen. Will check for LVH on echocardiogram. \par \par FUV 3 months or PRN

## 2021-09-02 NOTE — ASSESSMENT
[FreeTextEntry1] : Pt is a pleasant 82 y/o F with history of Atrial fibrillation, HTN, CKD4, HLD, DM (a1c 6.8), kidney mass), Left retinal detachment who presented to the office to establish care and management of her atrial fibrillation.\par \par 1. Atrial Fibrillation. Patient is currently on Afib, asymtpomatic, and rate controlled in the 90s. She is currently on Eliquis 2.5 BID and Atenolol 25BID. Will continue with current medications. Patient's las echocardiogram was done in 2017 prior to the diagnosis of Afib. Will obtain new echocardiogram for evaluation of EF/LA size due to persistent atrial fibrillation. \par 2. Hypertension: patient blood pressure is well controlled in the office and at home. She is currently on Telmisartan 80mg qd, Amlodipine 5mg qd, Chlorthalidone 25mg qd, and Spironolactone 25mg qd. Will continue current medications as they are appropriate and are controlling her blood pressure adequately. Encouraged to exercise, weight loss and follow a healthy diet low in salt.   \par 3. HLD: pt lipid profile reviewed.  Patient is out of benefit range of statin for primary prevention due to age > 75.\par 4. Diabetes: Hgb a1c 6.7. Pt is aware of this findings and is in discussion with her primary care doctor about initiation of medication. \par \par Patient can follow up with me in 6 months. If she develops any symptoms or feels like needs to be seen sooner, she is welcomed to make an appointment sooner.

## 2021-09-02 NOTE — PHYSICAL EXAM
[Normal S1, S2] : normal S1, S2 [No Murmur] : no murmur [No Rub] : no rub [No Gallop] : no gallop [No Cyanosis] : no cyanosis [No Clubbing] : no clubbing [Edema ___] : edema [unfilled] [Venous varicosities] : venous varicosities [Normal] : alert and oriented, normal memory [de-identified] : irregular rhythm

## 2021-09-02 NOTE — HISTORY OF PRESENT ILLNESS
[FreeTextEntry1] : Pt is a pleasant 82 y/o F with history of Atrial fibrillation, HTN, CKD4, HLD, DM (a1c 6.8), kidney mass), Left retinal detachment who presented to the office to establish care and management of her atrial fibrillation. Patient was diagnosed with Afib 1.5 years ago by Dr. Edmond and at that time was started on Eliquis. She was supposed to come to the office sooner but Covid made that difficult for her. Since that time she has gained weight because of "inactivity during Covid hibernation". Now she is starting to exercise more but her arthritis pain makes it difficult. She is able to ambulate around 10 blocks with a walker and has to rest because of joint pains. She denies any chest pain, palpitations, Shortness of breath, lower extremity edema, calculations, dizziness, light headedness during the day or during physical activity. She only feels her irregular rhythm at night when she lays on her left side. Patient last echo was in 2017 which showed normal ejection fraction.

## 2021-09-23 LAB
ALBUMIN SERPL ELPH-MCNC: 4.4 G/DL
ALP BLD-CCNC: 48 U/L
ALT SERPL-CCNC: 10 U/L
ANION GAP SERPL CALC-SCNC: 15 MMOL/L
AST SERPL-CCNC: 9 U/L
BASOPHILS # BLD AUTO: 0.08 K/UL
BASOPHILS NFR BLD AUTO: 0.8 %
BILIRUB SERPL-MCNC: 0.7 MG/DL
BUN SERPL-MCNC: 54 MG/DL
CALCIUM SERPL-MCNC: 9.9 MG/DL
CHLORIDE SERPL-SCNC: 103 MMOL/L
CHOLEST SERPL-MCNC: 253 MG/DL
CO2 SERPL-SCNC: 20 MMOL/L
CREAT SERPL-MCNC: 1.97 MG/DL
EOSINOPHIL # BLD AUTO: 0.19 K/UL
EOSINOPHIL NFR BLD AUTO: 1.8 %
ESTIMATED AVERAGE GLUCOSE: 148 MG/DL
GGT SERPL-CCNC: 23 U/L
GLUCOSE SERPL-MCNC: 150 MG/DL
HBA1C MFR BLD HPLC: 6.8 %
HCT VFR BLD CALC: 46.8 %
HDLC SERPL-MCNC: 38 MG/DL
HGB BLD-MCNC: 15.1 G/DL
IMM GRANULOCYTES NFR BLD AUTO: 0.4 %
LDLC SERPL CALC-MCNC: 182 MG/DL
LYMPHOCYTES # BLD AUTO: 2.59 K/UL
LYMPHOCYTES NFR BLD AUTO: 24.3 %
MAN DIFF?: NORMAL
MCHC RBC-ENTMCNC: 32.3 GM/DL
MCHC RBC-ENTMCNC: 33 PG
MCV RBC AUTO: 102.4 FL
MONOCYTES # BLD AUTO: 1.01 K/UL
MONOCYTES NFR BLD AUTO: 9.5 %
NEUTROPHILS # BLD AUTO: 6.73 K/UL
NEUTROPHILS NFR BLD AUTO: 63.2 %
NONHDLC SERPL-MCNC: 215 MG/DL
PLATELET # BLD AUTO: 285 K/UL
POTASSIUM SERPL-SCNC: 5.9 MMOL/L
PROT SERPL-MCNC: 7.3 G/DL
RBC # BLD: 4.57 M/UL
RBC # FLD: 14 %
SODIUM SERPL-SCNC: 138 MMOL/L
TRIGL SERPL-MCNC: 166 MG/DL
WBC # FLD AUTO: 10.64 K/UL

## 2021-09-27 ENCOUNTER — APPOINTMENT (OUTPATIENT)
Dept: INTERNAL MEDICINE | Facility: CLINIC | Age: 81
End: 2021-09-27
Payer: MEDICARE

## 2021-09-27 VITALS
BODY MASS INDEX: 32.61 KG/M2 | OXYGEN SATURATION: 95 % | WEIGHT: 191 LBS | SYSTOLIC BLOOD PRESSURE: 139 MMHG | TEMPERATURE: 98.1 F | HEIGHT: 64 IN | RESPIRATION RATE: 16 BRPM | HEART RATE: 77 BPM | DIASTOLIC BLOOD PRESSURE: 75 MMHG

## 2021-09-27 PROCEDURE — 90662 IIV NO PRSV INCREASED AG IM: CPT

## 2021-09-27 PROCEDURE — 99214 OFFICE O/P EST MOD 30 MIN: CPT | Mod: 25

## 2021-09-27 PROCEDURE — G0008: CPT

## 2021-09-27 NOTE — HEALTH RISK ASSESSMENT
[No] : In the past 12 months have you used drugs other than those required for medical reasons? No [No falls in past year] : Patient reported no falls in the past year [0] : 2) Feeling down, depressed, or hopeless: Not at all (0) [] : No [de-identified] : None [[id number]] : 0

## 2021-09-27 NOTE — ASSESSMENT
[FreeTextEntry1] : 81 year old female found to have stable Essential Hypertension, Type 2 Diabetes Mellitus with hyperglycemia, Hypercholesterolemia with Hypertriglyceridemia, Atrial Fibrillation, Chronic Renal Failure stage -3, Arthritis,with the current prescription regimen as recommended, diet and life style modifications, as counseled. Prior results reviewed, interpreted and discussed with the patient during today's examination, as appropriate. Follow up, treatment plan and tests, as ordered.\par \par Total time spent :  minutes\par Including:\par Preparation prior to visit - Reviewing prior record, results of tests and Consultation Reports as applicable\par Conducting an appropriate H & P during today's encounter\par Appropriate orders for tests, medications and procedures, as applicable\par Counseling patient \par Note completion\par

## 2021-09-27 NOTE — HISTORY OF PRESENT ILLNESS
[de-identified] : 81 year old  female patient with history of stable Essential Hypertension, Type 2 Diabetes Mellitus with hyperglycemia, Hypercholesterolemia with Hypertriglyceridemia, Chronic Renal Failure stage -3, Arthritis, history as stated, presented for follow up examination. Patient is compliant with all medications. Denies shortness of breath, chest pain or abdominal pains at this time. ROS as stated.\par

## 2022-01-27 ENCOUNTER — APPOINTMENT (OUTPATIENT)
Dept: INTERNAL MEDICINE | Facility: CLINIC | Age: 82
End: 2022-01-27

## 2022-02-02 ENCOUNTER — RX RENEWAL (OUTPATIENT)
Age: 82
End: 2022-02-02

## 2022-02-03 ENCOUNTER — RX RENEWAL (OUTPATIENT)
Age: 82
End: 2022-02-03

## 2022-02-14 ENCOUNTER — APPOINTMENT (OUTPATIENT)
Dept: CARDIOLOGY | Facility: CLINIC | Age: 82
End: 2022-02-14

## 2022-04-12 ENCOUNTER — APPOINTMENT (OUTPATIENT)
Dept: CARDIOLOGY | Facility: CLINIC | Age: 82
End: 2022-04-12
Payer: MEDICARE

## 2022-04-12 VITALS
DIASTOLIC BLOOD PRESSURE: 88 MMHG | OXYGEN SATURATION: 97 % | TEMPERATURE: 97.5 F | SYSTOLIC BLOOD PRESSURE: 121 MMHG | WEIGHT: 186 LBS | BODY MASS INDEX: 31.76 KG/M2 | HEART RATE: 105 BPM | HEIGHT: 64 IN

## 2022-04-12 DIAGNOSIS — Z78.9 OTHER SPECIFIED HEALTH STATUS: ICD-10-CM

## 2022-04-12 PROCEDURE — 99214 OFFICE O/P EST MOD 30 MIN: CPT

## 2022-04-14 PROBLEM — Z78.9 CURRENT DRINKER OF ALCOHOL: Status: ACTIVE | Noted: 2022-04-14

## 2022-04-14 NOTE — ASSESSMENT
[FreeTextEntry1] : Patient is an 83 y/o F with Hx of HTN, AFib on Eliquis, CKD, retinal detachment.\par \par 1) HTN \par - BP - 121/88 (115-130), HR - 105\par 1) amlodipine 5 mg daily\par 2) atenolol 25 mg and 50 mg daily\par 3) chlorthalidone 25 mg\par 4) Spironolactone 25 mg daily\par 5) Telmisartan 80 mg daily\par continue home meds\par \par 2) AFib on Eliquis\par - atenolol 25 mg and 50 mg daily\par - Echo normal EF, mild LVH, moderate MR (8/2021)\par \par 3) moderate MR (8/2021)\par - reports easy fatiguability\par - denies PND, orthopnea\par - continue current meds\par - can repeat Echo if symptoms of HF develop or worsens\par \par 4) Arthralgia\par - not relieved by Tylenol\par - pain meds as needed\par - this could be gout in the setting of daily alcohol intake and thiazide diuretic (chlorthalidone)\par - consider work-up for gout with PCP\par \par 5) Retinal Detachment\par - continue taking Prolensa\par - follow-up with Ophtha\par \par 6) T2DM\par - HbA1c - 6.8 (9/2021)\par - currently not on any meds\par \par 7) Leg Swelling and Varicose Veins\par - can be due to poor circulation\par - can elevate legs to relieve symptoms\par - consider compression stockings\par - consider Vascular Surgery consult if symptoms persists\par \par 8) CKD IV\par - last SCr - 1.93, eGFR - 23\par - consider ff-up w/ Nephro\par \par ff-up in 6 months

## 2022-04-14 NOTE — END OF VISIT
[] : Resident [FreeTextEntry3] : 82 F with HTN, pAF on Eliquis, and comorbidities as per above presenting for FUV.  Patient is euvolemic on exam and overall doing well from a cardiac perspective.  Her blood pressure is well controlled.\par –We will have patient continue all of her cardiac medications at this time.  She is also on Eliquis, with no bleeding issues\par –With respect to her varicose veins, patient was referred to follow-up with vascular surgery for further management\par –Patient also states she has not seen her nephrologist for over a year, she was instructed to follow-up for her CKD as well\par \par Follow-up visit in the office in 6 months or as needed

## 2022-04-14 NOTE — HISTORY OF PRESENT ILLNESS
[FreeTextEntry1] : Patient is an 83 y/o F w/ Hx of HTN, Afib on Eliquis, CKD, T2DM. She came here for her 8 month follow-up. Last check-up was Aug. 2021 which showed Echo with normal EF, mild LVH, mod MR. Current medications include 1) amlodipine 5 mg daily, 2) atenolol 25 mg and 50 mg daily, 3) chlorthalidone 25 mg, 4) Spironolactone 25 mg daily, 5) Telmisartan 80 mg daily. She has easy fatigability. She gets tired easily  as the years go by. She is able to walk 2 subway stops back-and-forth before getting tired. She denies any headache, dizziness, lightheadedness, blurring of vision, nausea, vomiting, chest pain, palpitations. She has shortness of breath when lying supine on her back and usually sleeps on her side. She states that she does not require any pillows to help her breathe. She has no orthopnea nor paroxysmal nocturnal dyspnea. Of note she has bilateral trace leg swelling and varicose veins. She started having arthralgia, especially on her big toe, fingers and ankle. She describes it as constant, burning pain. No recent accidents/ trauma. This started 3 weeks ago after starting to take Prolensa 1 drop twice daily as prescribed by her Ophthalmologist 3 weeks ago. She also has 1 drink per day (Scotch, Martini).

## 2022-04-25 ENCOUNTER — APPOINTMENT (OUTPATIENT)
Dept: INTERNAL MEDICINE | Facility: CLINIC | Age: 82
End: 2022-04-25
Payer: MEDICARE

## 2022-04-25 VITALS
RESPIRATION RATE: 16 BRPM | HEART RATE: 83 BPM | DIASTOLIC BLOOD PRESSURE: 83 MMHG | SYSTOLIC BLOOD PRESSURE: 149 MMHG | OXYGEN SATURATION: 93 % | WEIGHT: 194 LBS | TEMPERATURE: 98 F | HEIGHT: 64 IN | BODY MASS INDEX: 33.12 KG/M2

## 2022-04-25 PROCEDURE — 99214 OFFICE O/P EST MOD 30 MIN: CPT

## 2022-04-25 NOTE — HISTORY OF PRESENT ILLNESS
[de-identified] : 82 year old  female patient with history of stable Essential Hypertension, Type 2 Diabetes Mellitus with hyperglycemia, Hypercholesterolemia with Hypertriglyceridemia, Chronic Renal Failure stage -3, Arthritis, history as stated, presented for follow up examination. Patient is compliant with all medications. Denies shortness of breath, chest pain or abdominal pains at this time. ROS as stated.\par

## 2022-04-25 NOTE — ASSESSMENT
[FreeTextEntry1] : 82 year old female found to have stable Essential Hypertension, Type 2 Diabetes Mellitus with hyperglycemia, Hypercholesterolemia with Hypertriglyceridemia, Atrial Fibrillation, Chronic Renal Failure stage -3, Arthritis,with the current prescription regimen as recommended, diet and life style modifications, as counseled. Prior results reviewed, interpreted and discussed with the patient during today's examination, as appropriate. Follow up, treatment plan and tests, as ordered.\par \par Total time spent : 30 minutes\par Including:\par Preparation prior to visit - Reviewing prior record, results of tests and Consultation Reports as applicable\par Conducting an appropriate H & P during today's encounter\par Appropriate orders for tests, medications and procedures, as applicable\par Counseling patient \par Note completion\par

## 2022-07-18 ENCOUNTER — NON-APPOINTMENT (OUTPATIENT)
Age: 82
End: 2022-07-18

## 2022-07-25 ENCOUNTER — APPOINTMENT (OUTPATIENT)
Dept: INTERNAL MEDICINE | Facility: CLINIC | Age: 82
End: 2022-07-25

## 2022-07-25 VITALS
RESPIRATION RATE: 16 BRPM | SYSTOLIC BLOOD PRESSURE: 120 MMHG | HEART RATE: 86 BPM | HEIGHT: 64 IN | BODY MASS INDEX: 31.41 KG/M2 | OXYGEN SATURATION: 95 % | TEMPERATURE: 98 F | WEIGHT: 184 LBS | DIASTOLIC BLOOD PRESSURE: 73 MMHG

## 2022-07-25 DIAGNOSIS — F41.9 ANXIETY DISORDER, UNSPECIFIED: ICD-10-CM

## 2022-07-25 DIAGNOSIS — F32.A ANXIETY DISORDER, UNSPECIFIED: ICD-10-CM

## 2022-07-25 PROCEDURE — 99214 OFFICE O/P EST MOD 30 MIN: CPT

## 2022-07-25 NOTE — ASSESSMENT
[FreeTextEntry1] : 82 year old female found to have stable Essential Hypertension, Type 2 Diabetes Mellitus with hyperglycemia, Hypercholesterolemia with Hypertriglyceridemia, Atrial Fibrillation, Chronic Renal Failure stage -4, Arthritis,with the current prescription regimen as recommended, diet and life style modifications, as counseled. Prior results reviewed, interpreted and discussed with the patient during today's examination, as appropriate. Follow up, treatment plan and tests, as ordered.\par \par Total time spent : 30 minutes\par Including:\par Preparation prior to visit - Reviewing prior record, results of tests and Consultation Reports as applicable\par Conducting an appropriate H & P during today's encounter\par Appropriate orders for tests, medications and procedures, as applicable\par Counseling patient \par Note completion\par

## 2022-07-25 NOTE — HISTORY OF PRESENT ILLNESS
[de-identified] : 82 year old  female patient with history of stable Essential Hypertension, Type 2 Diabetes Mellitus with hyperglycemia, Hypercholesterolemia with Hypertriglyceridemia, Chronic Renal Failure stage -4, Arthritis, Primary Hyperaldosteronism, history as stated, presented for follow up examination. Patient is compliant with all medications. Denies shortness of breath, chest pain or abdominal pains at this time. ROS as stated.\par

## 2022-07-25 NOTE — HEALTH RISK ASSESSMENT
[Never] : Never [No] : In the past 12 months have you used drugs other than those required for medical reasons? No [No falls in past year] : Patient reported no falls in the past year [0] : 2) Feeling down, depressed, or hopeless: Not at all (0) [Intercurrent ED visits] : went to ED [de-identified] : July 21, 2022 [de-identified] : None [[id number]] : 0

## 2022-07-26 ENCOUNTER — NON-APPOINTMENT (OUTPATIENT)
Age: 82
End: 2022-07-26

## 2022-07-26 LAB
25(OH)D3 SERPL-MCNC: 63.9 NG/ML
ALBUMIN SERPL ELPH-MCNC: 4.2 G/DL
ALP BLD-CCNC: 38 U/L
ALT SERPL-CCNC: 10 U/L
ANION GAP SERPL CALC-SCNC: 14 MMOL/L
APPEARANCE: CLEAR
AST SERPL-CCNC: 8 U/L
BACTERIA: NEGATIVE
BASOPHILS # BLD AUTO: 0.08 K/UL
BASOPHILS NFR BLD AUTO: 0.7 %
BILIRUB SERPL-MCNC: 0.6 MG/DL
BILIRUBIN URINE: NEGATIVE
BLOOD URINE: ABNORMAL
BUN SERPL-MCNC: 81 MG/DL
CALCIUM SERPL-MCNC: 9.6 MG/DL
CHLORIDE SERPL-SCNC: 103 MMOL/L
CHOLEST SERPL-MCNC: 211 MG/DL
CO2 SERPL-SCNC: 16 MMOL/L
COLOR: YELLOW
CREAT SERPL-MCNC: 2.97 MG/DL
CREAT SPEC-SCNC: 183 MG/DL
EGFR: 15 ML/MIN/1.73M2
EOSINOPHIL # BLD AUTO: 0.22 K/UL
EOSINOPHIL NFR BLD AUTO: 1.9 %
ESTIMATED AVERAGE GLUCOSE: 154 MG/DL
GGT SERPL-CCNC: 14 U/L
GLUCOSE QUALITATIVE U: NEGATIVE
GLUCOSE SERPL-MCNC: 169 MG/DL
HBA1C MFR BLD HPLC: 7 %
HCT VFR BLD CALC: 45.9 %
HDLC SERPL-MCNC: 31 MG/DL
HGB BLD-MCNC: 14.6 G/DL
HYALINE CASTS: 3 /LPF
IMM GRANULOCYTES NFR BLD AUTO: 0.3 %
KETONES URINE: NEGATIVE
LDLC SERPL CALC-MCNC: 157 MG/DL
LEUKOCYTE ESTERASE URINE: ABNORMAL
LYMPHOCYTES # BLD AUTO: 2.49 K/UL
LYMPHOCYTES NFR BLD AUTO: 21.9 %
MAN DIFF?: NORMAL
MCHC RBC-ENTMCNC: 31.8 GM/DL
MCHC RBC-ENTMCNC: 32.4 PG
MCV RBC AUTO: 101.8 FL
MICROALBUMIN 24H UR DL<=1MG/L-MCNC: <1.2 MG/DL
MICROALBUMIN/CREAT 24H UR-RTO: NORMAL MG/G
MICROSCOPIC-UA: NORMAL
MONOCYTES # BLD AUTO: 1.28 K/UL
MONOCYTES NFR BLD AUTO: 11.3 %
NEUTROPHILS # BLD AUTO: 7.27 K/UL
NEUTROPHILS NFR BLD AUTO: 63.9 %
NITRITE URINE: NEGATIVE
NONHDLC SERPL-MCNC: 181 MG/DL
PH URINE: 5.5
PLATELET # BLD AUTO: 258 K/UL
POTASSIUM SERPL-SCNC: 4.8 MMOL/L
PROT SERPL-MCNC: 7 G/DL
PROTEIN URINE: NORMAL
RBC # BLD: 4.51 M/UL
RBC # FLD: 13.5 %
RED BLOOD CELLS URINE: 7 /HPF
SODIUM SERPL-SCNC: 134 MMOL/L
SPECIFIC GRAVITY URINE: 1.02
SQUAMOUS EPITHELIAL CELLS: 3 /HPF
TRIGL SERPL-MCNC: 117 MG/DL
TSH SERPL-ACNC: 2.04 UIU/ML
UROBILINOGEN URINE: NORMAL
WBC # FLD AUTO: 11.37 K/UL
WHITE BLOOD CELLS URINE: 9 /HPF

## 2022-08-01 ENCOUNTER — RX RENEWAL (OUTPATIENT)
Age: 82
End: 2022-08-01

## 2022-08-09 ENCOUNTER — NON-APPOINTMENT (OUTPATIENT)
Age: 82
End: 2022-08-09

## 2022-08-09 ENCOUNTER — EMERGENCY (EMERGENCY)
Facility: HOSPITAL | Age: 82
LOS: 1 days | Discharge: HOME | End: 2022-08-09
Attending: EMERGENCY MEDICINE
Payer: MEDICARE

## 2022-08-09 VITALS
WEIGHT: 179.9 LBS | HEIGHT: 65 IN | OXYGEN SATURATION: 96 % | DIASTOLIC BLOOD PRESSURE: 67 MMHG | RESPIRATION RATE: 18 BRPM | SYSTOLIC BLOOD PRESSURE: 118 MMHG | HEART RATE: 104 BPM | TEMPERATURE: 98 F

## 2022-08-09 VITALS
RESPIRATION RATE: 18 BRPM | OXYGEN SATURATION: 96 % | SYSTOLIC BLOOD PRESSURE: 119 MMHG | DIASTOLIC BLOOD PRESSURE: 73 MMHG | TEMPERATURE: 97 F | HEART RATE: 89 BPM

## 2022-08-09 LAB
ALBUMIN SERPL ELPH-MCNC: 3.3 G/DL — LOW (ref 3.5–5)
ALP SERPL-CCNC: 36 U/L — LOW (ref 40–120)
ALT FLD-CCNC: 19 U/L DA — SIGNIFICANT CHANGE UP (ref 10–60)
ANION GAP SERPL CALC-SCNC: 13 MMOL/L — SIGNIFICANT CHANGE UP (ref 5–17)
AST SERPL-CCNC: 13 U/L — SIGNIFICANT CHANGE UP (ref 10–40)
BASOPHILS # BLD AUTO: 0.06 K/UL — SIGNIFICANT CHANGE UP (ref 0–0.2)
BASOPHILS NFR BLD AUTO: 0.6 % — SIGNIFICANT CHANGE UP (ref 0–2)
BILIRUB SERPL-MCNC: 0.7 MG/DL — SIGNIFICANT CHANGE UP (ref 0.2–1.2)
BUN SERPL-MCNC: 86 MG/DL — HIGH (ref 7–18)
CALCIUM SERPL-MCNC: 9.5 MG/DL — SIGNIFICANT CHANGE UP (ref 8.4–10.5)
CHLORIDE SERPL-SCNC: 106 MMOL/L — SIGNIFICANT CHANGE UP (ref 96–108)
CO2 SERPL-SCNC: 18 MMOL/L — LOW (ref 22–31)
CREAT SERPL-MCNC: 2.7 MG/DL — HIGH (ref 0.5–1.3)
EGFR: 17 ML/MIN/1.73M2 — LOW
EOSINOPHIL # BLD AUTO: 0.14 K/UL — SIGNIFICANT CHANGE UP (ref 0–0.5)
EOSINOPHIL NFR BLD AUTO: 1.5 % — SIGNIFICANT CHANGE UP (ref 0–6)
GLUCOSE SERPL-MCNC: 149 MG/DL — HIGH (ref 70–99)
HCT VFR BLD CALC: 41.6 % — SIGNIFICANT CHANGE UP (ref 34.5–45)
HGB BLD-MCNC: 14 G/DL — SIGNIFICANT CHANGE UP (ref 11.5–15.5)
IMM GRANULOCYTES NFR BLD AUTO: 0.2 % — SIGNIFICANT CHANGE UP (ref 0–1.5)
LYMPHOCYTES # BLD AUTO: 1.89 K/UL — SIGNIFICANT CHANGE UP (ref 1–3.3)
LYMPHOCYTES # BLD AUTO: 19.7 % — SIGNIFICANT CHANGE UP (ref 13–44)
MAGNESIUM SERPL-MCNC: 2.1 MG/DL — SIGNIFICANT CHANGE UP (ref 1.6–2.6)
MCHC RBC-ENTMCNC: 32.6 PG — SIGNIFICANT CHANGE UP (ref 27–34)
MCHC RBC-ENTMCNC: 33.7 GM/DL — SIGNIFICANT CHANGE UP (ref 32–36)
MCV RBC AUTO: 96.7 FL — SIGNIFICANT CHANGE UP (ref 80–100)
MONOCYTES # BLD AUTO: 0.98 K/UL — HIGH (ref 0–0.9)
MONOCYTES NFR BLD AUTO: 10.2 % — SIGNIFICANT CHANGE UP (ref 2–14)
NEUTROPHILS # BLD AUTO: 6.5 K/UL — SIGNIFICANT CHANGE UP (ref 1.8–7.4)
NEUTROPHILS NFR BLD AUTO: 67.8 % — SIGNIFICANT CHANGE UP (ref 43–77)
NRBC # BLD: 0 /100 WBCS — SIGNIFICANT CHANGE UP (ref 0–0)
PLATELET # BLD AUTO: 261 K/UL — SIGNIFICANT CHANGE UP (ref 150–400)
POTASSIUM SERPL-MCNC: 4.3 MMOL/L — SIGNIFICANT CHANGE UP (ref 3.5–5.3)
POTASSIUM SERPL-SCNC: 4.3 MMOL/L — SIGNIFICANT CHANGE UP (ref 3.5–5.3)
PROT SERPL-MCNC: 7.9 G/DL — SIGNIFICANT CHANGE UP (ref 6–8.3)
RBC # BLD: 4.3 M/UL — SIGNIFICANT CHANGE UP (ref 3.8–5.2)
RBC # FLD: 13.5 % — SIGNIFICANT CHANGE UP (ref 10.3–14.5)
SODIUM SERPL-SCNC: 137 MMOL/L — SIGNIFICANT CHANGE UP (ref 135–145)
TROPONIN I, HIGH SENSITIVITY RESULT: 21.4 NG/L — SIGNIFICANT CHANGE UP
WBC # BLD: 9.59 K/UL — SIGNIFICANT CHANGE UP (ref 3.8–10.5)
WBC # FLD AUTO: 9.59 K/UL — SIGNIFICANT CHANGE UP (ref 3.8–10.5)

## 2022-08-09 PROCEDURE — 80053 COMPREHEN METABOLIC PANEL: CPT

## 2022-08-09 PROCEDURE — 36415 COLL VENOUS BLD VENIPUNCTURE: CPT

## 2022-08-09 PROCEDURE — 82962 GLUCOSE BLOOD TEST: CPT

## 2022-08-09 PROCEDURE — 99283 EMERGENCY DEPT VISIT LOW MDM: CPT

## 2022-08-09 PROCEDURE — 85025 COMPLETE CBC W/AUTO DIFF WBC: CPT

## 2022-08-09 PROCEDURE — 93005 ELECTROCARDIOGRAM TRACING: CPT

## 2022-08-09 PROCEDURE — 84484 ASSAY OF TROPONIN QUANT: CPT

## 2022-08-09 PROCEDURE — 99284 EMERGENCY DEPT VISIT MOD MDM: CPT

## 2022-08-09 PROCEDURE — 83735 ASSAY OF MAGNESIUM: CPT

## 2022-08-09 RX ORDER — SODIUM CHLORIDE 9 MG/ML
1000 INJECTION INTRAMUSCULAR; INTRAVENOUS; SUBCUTANEOUS ONCE
Refills: 0 | Status: COMPLETED | OUTPATIENT
Start: 2022-08-09 | End: 2022-08-09

## 2022-08-09 RX ADMIN — SODIUM CHLORIDE 1000 MILLILITER(S): 9 INJECTION INTRAMUSCULAR; INTRAVENOUS; SUBCUTANEOUS at 14:10

## 2022-08-09 NOTE — ED ADULT TRIAGE NOTE - CHIEF COMPLAINT QUOTE
I felt panicked that I couldn't breathe and haven't slept in 24 hrs. When I try to sleep I get panicked. Family friend reports no a/c in apartment. Hx afib

## 2022-08-09 NOTE — ED PROVIDER NOTE - EYES, MLM
From: Teri Booker  To: Geovanny Maxwell MD  Sent: 4/23/2020 6:46 PM CDT  Subject: Medication Question    Hi, the increase in the Fentanyl has been great. The majority of the pain in my abdomen has quieted, thank goodness. However, the breakthrough pain is still immense and definitely would not like to decrease to 3 a day from 4 a day but would even like to increase to the higher dose like I was on before. I just want my quality of life back. Especially during these stressful times. Please let me know ASAP if you can change my prescription. I am due to pick it up Saturday in [place], [place]. Feel free to call me to discuss at [phone]. Thanks    Tanika   Clear bilaterally, pupils equal, round and reactive to light.

## 2022-08-09 NOTE — ED PROVIDER NOTE - OBJECTIVE STATEMENT
82 y.o female with a history of a-fib, hypertension, and anxiety was brought in by EMS from home feeling nausea, anxiety, fatigue, and difficulty sleeping over recent several days. Patient believes that severe heat and lack of air conditioning at home May contribute to symptoms. Patient states she lives at home with her , who is doing well. Patient denies fever, shortness of breath, chest pain, syncope, and confusion. Patient denies smoking.

## 2022-08-09 NOTE — ED ADULT NURSE NOTE - ADDITIONAL COMPLAINTS
Additional Complaints
Principal Discharge DX:	Fever  Secondary Diagnosis:	Ventriculopleural shunt status

## 2022-08-09 NOTE — ED PROVIDER NOTE - NSFOLLOWUPINSTRUCTIONS_ED_ALL_ED_FT
Heat Exhaustion      Heat exhaustion happens when the body gets overheated from hot weather, exercise, strenuous physical activity, dehydration, or a combination of these. It is important to treat heat exhaustion as soon as possible. If untreated, heat exhaustion can lead to heat stroke, which is a medical emergency and can be life-threatening.      What are the causes?    Common causes of heat exhaustion include:  •Exercising or doing strenuous labor or activity in hot or humid weather.      •Being exposed to very warm and poorly ventilated environments, such as living in a home without air conditioning or being in a car without good ventilation.      •Not drinking enough water, especially in hot or humid weather.      •Not having enough salts and minerals in the blood (electrolytes).        What increases the risk?    The following factors may make you more likely to develop this condition:  •Exercising beyond your fitness level in hot conditions.      •Exercising or working in hot weather when your body is not used to the heat.      •Wearing clothing that does not allow your sweat to evaporate.      •Drinking a lot of alcoholic beverages or beverages that have caffeine. This can lead to dehydration.      •Being age 65 or older.      •Being a child.      •Having certain chronic medical conditions, such as heart disease, poor circulation or other vascular diseases, sickle cell disease, high blood pressure, diabetes, lung disease, or cystic fibrosis.      •Being very overweight (obese).      •Taking certain medicines, such as those for high blood pressure, antihistamines, or tranquilizers.      •Using drugs such as cocaine, heroin, or amphetamines.        What are the signs or symptoms?    Symptoms of this condition include:  •Heavy sweating along with feeling weak, dizzy, light-headed, and nauseous.      •Vomiting.      •Red, blotchy rash over the body. This is also called prickly heat.      •Rapid heartbeat.      •Headache.      •Body temperature over 102.2ºF (39ºC).      •Urine that is darker than normal.      •Muscle cramps, such as in the leg or side (flank).      •Moist, cool, and clammy skin.      •Fatigue.      •Thirst.      •Difficulty focusing or concentrating.      •Passing out.      Signs and symptoms may develop suddenly or over time.      How is this diagnosed?    This condition may be diagnosed based on your symptoms, a physical exam, and history of heat exposure.      How is this treated?    This condition may be treated by:  •Immediately stopping physical activity.      •Moving to a cooler, shaded area with good ventilation and airflow, or to an air-conditioned environment.      •Removing all unnecessary clothing to expose as much skin to the air as possible.      •Using cooling fans and water-misting sprays to cool the body down.      •Drinking plenty of fluids, including sports drinks with electrolytes.      •Having cooling blankets placed on you to lower your body temperature.      •Giving you fluids through an IV in a hospital.        Follow these instructions at home:     If you think that you have heat exhaustion:  •Ask a friend or a family member to stay with you.      •Take a cool bath or shower.      •Drink enough fluid to keep your urine pale yellow.      •Lie down and rest.      •Return to your normal activities as told by your health care provider. Ask your health care provider what activities are safe for you.        Contact a health care provider if:    •Symptoms last for more than 30 minutes.      •You feel your symptoms are not improving after taking steps to cool down.        Get help right away if:  •You have any symptoms of heat stroke. These include:  •Temperature of 104°F (40°C) or higher.      •Diffusely red skin.      •Inability to sweat, resulting in hot, dry skin.      •Excessive thirst.      •Nausea and vomiting.      •Rapid breathing.      •Chest pain.      •Headache.      •Confusion or disorientation.      •Fainting.      •Seizure.        These symptoms may represent a serious problem that is an emergency. Do not wait to see if the symptoms will go away. Get medical help right away. Call your local emergency services (911 in the [place] Do not drive yourself to the hospital.       Summary    •Heat exhaustion happens when your body gets overheated from hot weather, strenuous activity, and dehydration.      •Symptoms include sweating, fatigue, muscle cramps, and headache.      •Treat heat exhaustion immediately by moving to a ventilated and cool environment, having cool water sprayed on as much of the skin as possible, removing all unnecessary clothing, and drinking fluids with electrolytes.      •If your symptoms last for more than 30 minutes, contact a health care provider.      •If untreated, heat exhaustion can lead to heat stroke, which is a medical emergency and can be life-threatening.      This information is not intended to replace advice given to you by your health care provider. Make sure you discuss any questions you have with your health care provider.      Document Revised: 06/14/2021 Document Reviewed: 06/14/2021    Elsevier Patient Education © 2022 [employer]

## 2022-08-09 NOTE — ED PROVIDER NOTE - CLINICAL SUMMARY MEDICAL DECISION MAKING FREE TEXT BOX
Patient with vague symptoms including insomnia and nausea. Will get basic labs, EKG, and intravenous fluids. Patient will go home with friend and says that if discharged, she has another place to go with air conditioning until the weather cools off later tonight.

## 2022-08-09 NOTE — ED PROVIDER NOTE - PATIENT PORTAL LINK FT
You can access the FollowMyHealth Patient Portal offered by [facility] by registering at the following website: [email or web address]. By joining [facility]’s FollowMyHealth portal, you will also be able to view your health information using other applications (apps) compatible with our system.

## 2022-08-19 ENCOUNTER — APPOINTMENT (OUTPATIENT)
Dept: NEPHROLOGY | Facility: CLINIC | Age: 82
End: 2022-08-19

## 2022-08-19 VITALS
DIASTOLIC BLOOD PRESSURE: 84 MMHG | SYSTOLIC BLOOD PRESSURE: 148 MMHG | WEIGHT: 181.88 LBS | OXYGEN SATURATION: 99 % | HEIGHT: 64 IN | BODY MASS INDEX: 31.05 KG/M2 | TEMPERATURE: 97.7 F | HEART RATE: 100 BPM

## 2022-08-19 LAB
ALBUMIN SERPL ELPH-MCNC: 4.2 G/DL
ANION GAP SERPL CALC-SCNC: 16 MMOL/L
BUN SERPL-MCNC: 72 MG/DL
CALCIUM SERPL-MCNC: 9.8 MG/DL
CHLORIDE SERPL-SCNC: 104 MMOL/L
CO2 SERPL-SCNC: 17 MMOL/L
CREAT SERPL-MCNC: 2.3 MG/DL
CYSTATIN C SERPL-MCNC: 2.66 MG/L
EGFR: 21 ML/MIN/1.73M2
GFR/BSA.PRED SERPLBLD CYS-BASED-ARV: 18 ML/MIN/1.73M2
GLUCOSE SERPL-MCNC: 133 MG/DL
PHOSPHATE SERPL-MCNC: 4.1 MG/DL
POTASSIUM SERPL-SCNC: 4.6 MMOL/L
SODIUM SERPL-SCNC: 137 MMOL/L

## 2022-08-19 PROCEDURE — 99215 OFFICE O/P EST HI 40 MIN: CPT

## 2022-08-19 NOTE — ASSESSMENT
[FreeTextEntry1] : Ms. 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 Would make no changes for today, but would monitor.\par \par CKD IV; hypertension; hyperkalemia\par - Cont. chlorthalidone, spironolactone, atenalol, amlodipine\par - Cont. telmistartan 80mg daily for now\par - Start baking soda 1 tsp in water daily for metabolic acidosis\par \par Pre-DM, controlled\par - Cont. current meds\par \par Afib: Cont. apixaban\par \par \par \par Follow up in 6-8 weeks with labs prior\par \par I have spent a total of 40 minutes in which >50% was spent in discussion with patient regarding chronic kidney disease and hypertension.

## 2022-08-19 NOTE — HISTORY OF PRESENT ILLNESS
[FreeTextEntry1] : Contacts:\par 	Dr. Harlan Edmond (PCP)\par 	cell, [phone]\par 	\par -------------------------------------------------------------------------------\par Problem List:\par 	Chronic kidney disease\par 		CKD IV (SCr 1.7)\par 		Small amount of albumin-predominant proteinuria (200mg protein, 112mg albumin); most recently negative (2019)\par 	Hypertension x20+ years, difficult to control at times\par 	Diabetes mellitus\par 	Atrial fibrillation\par 	Osteoarthritis, sciatica\par 	History of basal cell cancer\par \par -------------------------------------------------------------------------------\par HPI:\par Ms. 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 She was referred back after a long absence because her other physicians noted worsening kidney funciton.\par \par BP has been 110-130 systolic, average of 120.\par \par She has recently been undergoing Avastin injections in her eyes, started in 2022.\par \par Has been using a walker for improved mobility of late.\par \par Ms. Jim believes she had a gout attack 2-3 months ago, when her toe was inflamed and painful.\par \par She notes that she no longer drinks alcohol, and she feels better (formerly had about 1 glass of wine or cocktail with meals occastionally).\par \par Ms. Jim has no headache, lightheadedness, dizziness, sore throat, cough, dyspnea, chest pain, abdominal pain, diarrhea, constipation, dysuria, hematuria. She does get aches and pains in her feet/joints sometimes (arthritis).\par \par ROS: All other systems were reviewed and are negative, except as per HPI.\par 	\par -------------------------------------------------------------------------------\par Social History:\par 	From [place], lives in [place], [place], for last 35+ years\par 	Retired 2016; worked as \par 	Lives with \par 	Former smoker, quit 30+ years ago; max several cigs per day\par 	Single alcoholic drink per day, on average\par \par Family History:\par 	Parents were first cousins\par 	Father had DM and  of 58 of diabetic complications (had heart attack); maternal grandmother had DM\par 	Mother had hypertension,  age 92\par 	Brother  of mesothelioma age 62\par 	No known history of renal disease, hematuria, proteinuria, ESRD, dialysis, transplant\par \par -------------------------------------------------------------------------------\par Allergies: Codeine, opioids, latex, pineapple, chocolate\par \par Medications:\par 	Amlodipine 5mg daily (evening)\par 	Atenolol 50mg BID\par 	Telmisartan 80mg daily (evening)\par 	Chlorthalidone 25mg daily (morning)\par 	Spironolactone 25mg daily (morning)\par 	\par 	Apixaban (Eliquis) 2.5mg BID\par 	Ofloxacin eye drops\par 	Systane eye drops\par 	Vitamin D 2000 IU daily\par 	\par 	Calcium citrate + D3 500mg + 400 units daily\par 	Aspirin 81mg daily\par 	Ketoconazole cream\par 	\par -------------------------------------------------------------------------------\par Physical Exam:\par \par 	Gen: NAD, well-appearing\par 	HEENT: Supple neck\par 	Pulm: CTA\par 	CV: Irregular\par 	Back: No spinal or CVA terndeness\par 	Abd: +BS, soft, nontender/nondistended\par 	UE: Warm, FROM, intact strength\par 	LE: Warm, FROM, intact strength; 1+ bilateral edema\par 	Neuro: No focal deficits, intact gait\par 	Psych: Normal affect\par 	Skin: Warm, without rashes\par 	\par -------------------------------------------------------------------------------\par Labs/Studies:\par [Reviewed in [employer]] \par \par 	2022\par \par 		137 | 104 | 72\par 		-----------------< 133 Ca 9.8 eGFR 21\par 		4.6 |  17 | 2.3\par 		\par 		Phosphorus 4.1\par 		Albumin 4.2\par \par 	Creatinine Trend\par 		2022 SCr 2.30 (eGFR 21; Cystatin-C 2.66, eGFR 18) > combined 19\par 		2022 SCr 2.70 (eGFR 17)\par 		2022 SCr 2.97 (eGFR 15)\par 		2021 SCr 1.97 (eGFR 25)\par 		2021-06-10 SCr 1.88 (eGFR 27)\par 		2020 SCr 1.84 (eGFR 27)\par 		2020 SCr 1.90 (eGFR 27)\par 		2019 SCr 2.70 (eGFR 17)\par 		2019 SCr 1.64\par 		2019 SCr 1.73\par 		2018-10-24 SCr 2.01\par 		2018 SCr 1.69\par 		2018 SCr 1.58\par 		2018 SCr 1.59\par 		2018 SCr 1.56\par 		2018 SCr 1.92\par 		2017 SCr 1.76\par 		2017 SCr 1.74\par 		2017 SCr 1.67\par 		2016 SCr 1.27\par 		\par 	2018 UPC ratio = 0.1 g/g\par 	2017 UPC ratio = 0.2 g/g\par 	\par 	2022 UAC ratio = negative\par 	2021-06-10 UAC ratio = 48 mg/g\par 	2019 UAC ratio = negative\par 	2019 UAC ratio = negative\par 	2017 UAC ratio = 112mg/g\par 		\par 	2022 U/A: protein trace, blood small, LE large, RBC 7, WBC 9, bacteria negative\par \par 	2022 CBC: WBC 9.6 / hgb 14 / plt 261\par 	2022 Lipid: chol 211, , HDL 31, \par 	2022 HbA1c 7\par 	2022 Vitamin D (25OH) 64\par 	 \par 	2018 Renin activity 0.174 ng/mL/hr\par 	2018 Aldosterone 14.8

## 2022-09-02 ENCOUNTER — TRANSCRIPTION ENCOUNTER (OUTPATIENT)
Age: 82
End: 2022-09-02

## 2022-09-26 ENCOUNTER — APPOINTMENT (OUTPATIENT)
Dept: INTERNAL MEDICINE | Facility: CLINIC | Age: 82
End: 2022-09-26

## 2022-09-26 PROCEDURE — 99443: CPT | Mod: 95

## 2022-09-26 NOTE — HISTORY OF PRESENT ILLNESS
[Home] : at home, [unfilled] , at the time of the visit. [Medical Office: ([facility])___] : at the medical office located in  [Verbal consent obtained from patient] : the patient, [unfilled] [FreeTextEntry4] : Kaylen BEASLEY [de-identified] : 82 year female  patient with history of stable Essential Hypertension, Type 2 Diabetes Mellitus with hyperglycemia, Hypercholesterolemia with Hypertriglyceridemia, Chronic Renal Failure stage -4, Arthritis, Primary Hyperaldosteronism, Atrial Fibrillation, history as stated, initiated telephonic visit for Disease Management and Medication Adherence.\par \par

## 2022-09-26 NOTE — REVIEW OF SYSTEMS
[FreeTextEntry9] : Improving left foot painful swelling responding to an elevation of the foot and Tylenol as needed, as counseled.

## 2022-09-26 NOTE — HEALTH RISK ASSESSMENT
[Never] : Never [No] : In the past 12 months have you used drugs other than those required for medical reasons? No [No falls in past year] : Patient reported no falls in the past year [0] : 2) Feeling down, depressed, or hopeless: Not at all (0) [de-identified] : NEPH [[id number]] : 0

## 2022-09-26 NOTE — ASSESSMENT
[FreeTextEntry1] : Time spent for this encounter : 25  minutes.\par More than 50 % of the time spent for - Disease Management and Medication Adherence.\par \par 82 year F patient found to have stable Essential Hypertension, Type 2 Diabetes Mellitus with hyperglycemia, Atrial Fibrillation, Hypercholesterolemia with Hypertriglyceridemia, Chronic Renal Failure stage -4, Arthritis, Primary Hyperaldosteronism,with the current regimen, diet and life style modifications, as counseled. Prior results reviewed and discussed with the patient during today's encounter. Plan as ordered.\par \par Patient granted verbal permission to provide Telephonic/Tele Health service in reference to today's encounter, as a substitute form of a visit, for a standard office visit encounter in the phase of an ongoing Pandemic / Covid -19, with the awareness and acceptance of a possible limited nature of such an encounter, with a possibility of an inadvertent omission of possible findings and misleading treatment options, due to possible erroneous and/or incomplete diagnostic impressions.\par

## 2022-10-07 ENCOUNTER — APPOINTMENT (OUTPATIENT)
Dept: NEPHROLOGY | Facility: CLINIC | Age: 82
End: 2022-10-07

## 2022-10-07 DIAGNOSIS — M10.9 GOUT, UNSPECIFIED: ICD-10-CM

## 2022-10-07 PROCEDURE — 99443: CPT | Mod: 95

## 2022-10-07 NOTE — HISTORY OF PRESENT ILLNESS
[FreeTextEntry1] : Contacts:\par 	Dr. Harlan Edmond (PCP)\par 	cell, [phone]\par 	\par -------------------------------------------------------------------------------\par Problem List:\par 	Chronic kidney disease\par 		CKD IV (SCr 1.7)\par 		Small amount of albumin-predominant proteinuria (200mg protein, 112mg albumin); most recently negative (2019)\par 	Hypertension x20+ years, difficult to control at times\par 	Diabetes mellitus\par 	Atrial fibrillation\par 	Osteoarthritis, sciatica\par 	History of basal cell cancer\par \par -------------------------------------------------------------------------------\par HPI:\par Ms. Jim is a 82 year old woman with chronic kidney disease stage IV with proteinuria, uncontrolled hypertension, pre-DM, borderline obesity, and osteroarthritis, here for hypertension and renal follow up.\par \par Last saw Ms. Jim in 2022. At last visit, we started baking soda for metabolic acidosis.\par \par She was unable to come in today because of an episode of (presumed) gout in her foot. Getting better slowly, mostly painful when walking on it. Using Tylenol for pain. Ms. Jim notes that she had another episode of gout in the past, a month or two ago and lasted only a few days.\par \par She notes that she has improved her diet of late, and her weight has decreased from about 180 to 170 lbs.\par \par Ms. Jim has no headache, lightheadedness, dizziness, sore throat, cough, dyspnea, chest pain, abdominal pain, diarrhea, constipation, dysuria, hematuria. She does get aches and pains in her feet/joints sometimes (arthritis) -- and this has worsened currently during gout flare.\par \par ROS: All other systems were reviewed and are negative, except as per HPI.\par 	\par -------------------------------------------------------------------------------\par Social History:\par 	From [place], lives in [place], [place], for last 35+ years\par 	Retired 2016; worked as \par 	Lives with \par 	Former smoker, quit 30+ years ago; max several cigs per day\par 	Single alcoholic drink per day, on average\par \par Family History:\par 	Parents were first cousins\par 	Father had DM and  of 58 of diabetic complications (had heart attack); maternal grandmother had DM\par 	Mother had hypertension,  age 92\par 	Brother  of mesothelioma age 62\par 	No known history of renal disease, hematuria, proteinuria, ESRD, dialysis, transplant\par \par -------------------------------------------------------------------------------\par Allergies: Codeine, opioids, latex, pineapple, chocolate\par \par Medications:\par 	Amlodipine 5mg daily (evening)\par 	Atenolol 50mg BID\par 	Telmisartan 80mg daily (evening)\par 	Chlorthalidone 25mg daily (morning)\par 	Spironolactone 25mg daily (morning)\par 	\par 	Apixaban (Eliquis) 2.5mg BID\par 	Ofloxacin eye drops\par 	Systane eye drops\par 	Vitamin D 2000 IU daily\par 	\par 	Calcium citrate + D3 500mg + 400 units daily\par 	Aspirin 81mg daily\par 	Ketoconazole cream\par 	\par -------------------------------------------------------------------------------\par Physical Exam: N/A\par 	\par -------------------------------------------------------------------------------\par Labs/Studies:\par [Reviewed in [employer]] \par \par 	2022\par \par 		139 | 101 | 36\par 		------------------< 137 Ca 9.5 eGFR 26\par 		4.6 |  24 | 1.88\par 		\par 		Albumin 3.8\par 		Uric acid 10.2\par \par 	Creatinine Trend\par 		2022 SCr 1.88 (eGFR 26)\par 		2022 SCr 2.30 (eGFR 21; Cystatin-C 2.66, eGFR 18) >> combined 19\par 		2022 SCr 2.70 (eGFR 17)\par 		2022 SCr 2.97 (eGFR 15)\par 		2021 SCr 1.97 (eGFR 25)\par 		2021-06-10 SCr 1.88 (eGFR 27)\par 		2020 SCr 1.84 (eGFR 27)\par 		2020 SCr 1.90 (eGFR 27)\par 		2019 SCr 2.70 (eGFR 17)\par 		2019 SCr 1.64\par 		2019 SCr 1.73\par 		2018-10-24 SCr 2.01\par 		2018 SCr 1.69\par 		2018 SCr 1.58\par 		2018 SCr 1.59\par 		2018 SCr 1.56\par 		2018 SCr 1.92\par 		2017 SCr 1.76\par 		2017 SCr 1.74\par 		2017 SCr 1.67\par 		2016 SCr 1.27\par 		\par 	2018 UPC ratio = 0.1 g/g\par 	2017 UPC ratio = 0.2 g/g\par 	\par 	2022 UAC ratio = negative\par 	2021-06-10 UAC ratio = 48 mg/g\par 	2019 UAC ratio = negative\par 	2019 UAC ratio = negative\par 	2017 UAC ratio = 112mg/g\par 		\par 	2022 U/A: protein trace, blood small, LE large, RBC 7, WBC 9, bacteria negative\par \par 	2022 CBC: WBC 13.6 / hgb 11.9 / plt 270\par 	2022 Lipid: chol 187, , HDL 35, \par 	2022 HbA1c 6.6\par 	2022 Vitamin D (25OH) 64\par 	 \par 	2018 Renin activity 0.174 ng/mL/hr\par 	2018 Aldosterone 14.8

## 2022-10-07 NOTE — ASSESSMENT
[FreeTextEntry1] : Ms. Jim is a 82 year old woman with chronic kidney disease stage IV with proteinuria, uncontrolled hypertension, pre-DM, borderline obesity, and osteroarthritis, here for hypertension and renal follow up.\par \par Stability and some improvement of kidney function. Stable electrolytes.\par \par CKD IV; hypertension; hyperkalemia\par - Cont. chlorthalidone, spironolactone, atenalol, amlodipine\par - Cont. telmistartan 80mg daily for now\par - Start baking soda 1 tsp in water daily for metabolic acidosis\par \par Pre-DM, controlled\par - Cont. current meds\par \par Afib: Cont. apixaban\par \par Gout: Dr. Edmond managing at this time. Ms. Jim is reluctant to take anything other than Tylenol, and notes that it is already improving\par \par \par Follow up once more this calendar year\par \par I have spent a total of 25 minutes in discussion with patient regarding chronic kidney disease, hypertension, gout, and diet.

## 2022-10-18 ENCOUNTER — APPOINTMENT (OUTPATIENT)
Dept: CARDIOLOGY | Facility: CLINIC | Age: 82
End: 2022-10-18

## 2022-10-18 PROCEDURE — 99442: CPT | Mod: 95

## 2022-10-22 NOTE — ASSESSMENT
[FreeTextEntry1] : 82-year-old female with HTN, paroxysmal A. fib on Eliquis, CKD, COVID-19, T2DM, with echocardiogram 08/2021 showing mild LVH and normal EF who presents for follow-up visit. \par \par 1. HTN: Seems well controlled at home on amlodipine, atenolol, chlorthalidone, telmisartan, and spironolactone\par \par 2. Paroxysmal AFib: On Eliquis 2.5mg PO BID (age > 80, creatinine > 1.5)\par -Rate controlled on atenolol\par \par 3. HLD: Patient is out of benefit range of statin for primary prevention due to age > 75.\par \par FUV 6 months or PRN

## 2022-10-22 NOTE — REASON FOR VISIT
[Hypertension] : hypertension [Other: ____] : [unfilled] [Medical Office: ([facility])___] : at the medical office located in  [Home] : at home, [unfilled] , at the time of the visit. [Verbal consent obtained from patient] : the patient, [unfilled] [FreeTextEntry1] : Telephonic Billing Encounter: \par \par The patient/family member called to speak with me regarding their condition. The patient is known to my cardiology practice. The patient was told that the telephone conversation may be a billable encounter. Verbal consent was obtained earlier today for the telephonic billing encounter. The patient denies being seen or evaluated by another cardiologist over the past 7 days. An RPA (return patient appointment) was created in the schedule.\par \par \par \par \par \par \par

## 2022-10-22 NOTE — HISTORY OF PRESENT ILLNESS
[FreeTextEntry1] : 82-year-old female with HTN, paroxysmal A. fib on Eliquis, CKD, COVID-19, T2DM, with echocardiogram 08/2021 showing mild LVH and normal EF who presents for follow-up visit.  Patient reports that she had 2 episodes of "heat fatigue" over the summer due to poor water intake.  She also had RAMAN on CKD.  She reports that she has been feeling better but this week was started to have a gout attack and associated did not come in person and requested a televisit.  Patient reports that her blood pressure at home runs in the 120s to 130s systolic.  Patient reports that due to the gout, she changed her diet and has lost about 10 pounds.  She reports that she cut down on her snacks.  Currently denies any chest pain, dyspnea, palpitations, lightheadedness, or syncope. Denies LE edema, orthopnea, or PND. Patient reports compliance with all medications, denies adverse effects.\par \par \par

## 2022-11-29 ENCOUNTER — NON-APPOINTMENT (OUTPATIENT)
Age: 82
End: 2022-11-29

## 2022-11-29 ENCOUNTER — APPOINTMENT (OUTPATIENT)
Dept: INTERNAL MEDICINE | Facility: CLINIC | Age: 82
End: 2022-11-29

## 2022-11-29 VITALS
SYSTOLIC BLOOD PRESSURE: 162 MMHG | WEIGHT: 178 LBS | BODY MASS INDEX: 30.39 KG/M2 | TEMPERATURE: 97 F | OXYGEN SATURATION: 97 % | DIASTOLIC BLOOD PRESSURE: 80 MMHG | HEART RATE: 92 BPM | RESPIRATION RATE: 16 BRPM | HEIGHT: 64 IN

## 2022-11-29 PROCEDURE — G0008: CPT

## 2022-11-29 PROCEDURE — 90662 IIV NO PRSV INCREASED AG IM: CPT

## 2022-11-29 PROCEDURE — 93000 ELECTROCARDIOGRAM COMPLETE: CPT

## 2022-11-29 PROCEDURE — 99214 OFFICE O/P EST MOD 30 MIN: CPT | Mod: 25

## 2022-11-29 NOTE — HISTORY OF PRESENT ILLNESS
[de-identified] : 82 year old  female patient with history of stable Essential Hypertension, Type 2 Diabetes Mellitus with hyperglycemia, Hypercholesterolemia with Hypertriglyceridemia, Chronic Renal Failure stage -4, Arthritis, Primary Hyperaldosteronism, history as stated, presented for follow up examination. Patient is compliant with all medications. Denies shortness of breath, chest pain or abdominal pains at this time. ROS as stated.\par

## 2022-11-29 NOTE — HEALTH RISK ASSESSMENT
[Never] : Never [No] : In the past 12 months have you used drugs other than those required for medical reasons? No [No falls in past year] : Patient reported no falls in the past year [0] : 2) Feeling down, depressed, or hopeless: Not at all (0) [de-identified] : CARD/NEPH [[id number]] : 0

## 2022-11-29 NOTE — ASSESSMENT
[FreeTextEntry1] : 82 year old female found to have stable Essential Hypertension, Type 2 Diabetes Mellitus with hyperglycemia, Hypercholesterolemia with Hypertriglyceridemia, Atrial Fibrillation, Chronic Renal Failure stage -4, Arthritis,with the current prescription regimen as recommended, diet and life style modifications, as counseled. Prior results reviewed, interpreted and discussed with the patient during today's examination, as appropriate. Follow up, treatment plan and tests, as ordered.\par \par EKG:\par Atrial fibrillation with CVR @ 91 BPM.\par Nonspecific ST-T changes noted\par \par Total time spent : 30 minutes\par Including:\par Preparation prior to visit - Reviewing prior record, results of tests and Consultation Reports as applicable\par Conducting an appropriate H & P during today's encounter\par Appropriate orders for tests, medications and procedures, as applicable\par Counseling patient \par Note completion\par

## 2022-12-01 LAB
ALBUMIN SERPL ELPH-MCNC: 4.3 G/DL
ALP BLD-CCNC: 48 U/L
ALT SERPL-CCNC: 10 U/L
ANION GAP SERPL CALC-SCNC: 13 MMOL/L
AST SERPL-CCNC: 11 U/L
BASOPHILS # BLD AUTO: 0.07 K/UL
BASOPHILS NFR BLD AUTO: 0.7 %
BILIRUB SERPL-MCNC: 0.4 MG/DL
BUN SERPL-MCNC: 50 MG/DL
CALCIUM SERPL-MCNC: 10.1 MG/DL
CHLORIDE SERPL-SCNC: 102 MMOL/L
CHOLEST SERPL-MCNC: 206 MG/DL
CO2 SERPL-SCNC: 26 MMOL/L
CREAT SERPL-MCNC: 1.9 MG/DL
EGFR: 26 ML/MIN/1.73M2
EOSINOPHIL # BLD AUTO: 0.14 K/UL
EOSINOPHIL NFR BLD AUTO: 1.3 %
ESTIMATED AVERAGE GLUCOSE: 146 MG/DL
GGT SERPL-CCNC: 15 U/L
GLUCOSE SERPL-MCNC: 135 MG/DL
HBA1C MFR BLD HPLC: 6.7 %
HCT VFR BLD CALC: 45.8 %
HDLC SERPL-MCNC: 42 MG/DL
HGB BLD-MCNC: 14.2 G/DL
IMM GRANULOCYTES NFR BLD AUTO: 0.5 %
LDLC SERPL CALC-MCNC: 147 MG/DL
LYMPHOCYTES # BLD AUTO: 2.12 K/UL
LYMPHOCYTES NFR BLD AUTO: 19.8 %
MAN DIFF?: NORMAL
MCHC RBC-ENTMCNC: 31 GM/DL
MCHC RBC-ENTMCNC: 31.8 PG
MCV RBC AUTO: 102.5 FL
MONOCYTES # BLD AUTO: 1.05 K/UL
MONOCYTES NFR BLD AUTO: 9.8 %
NEUTROPHILS # BLD AUTO: 7.29 K/UL
NEUTROPHILS NFR BLD AUTO: 67.9 %
NONHDLC SERPL-MCNC: 164 MG/DL
PLATELET # BLD AUTO: 294 K/UL
POTASSIUM SERPL-SCNC: 4.7 MMOL/L
PROT SERPL-MCNC: 7.2 G/DL
RBC # BLD: 4.47 M/UL
RBC # FLD: 13.7 %
SODIUM SERPL-SCNC: 141 MMOL/L
TRIGL SERPL-MCNC: 85 MG/DL
URATE SERPL-MCNC: 9.5 MG/DL
WBC # FLD AUTO: 10.72 K/UL

## 2022-12-07 ENCOUNTER — NON-APPOINTMENT (OUTPATIENT)
Age: 82
End: 2022-12-07

## 2022-12-09 ENCOUNTER — APPOINTMENT (OUTPATIENT)
Dept: NEPHROLOGY | Facility: CLINIC | Age: 82
End: 2022-12-09

## 2023-02-28 ENCOUNTER — APPOINTMENT (OUTPATIENT)
Dept: INTERNAL MEDICINE | Facility: CLINIC | Age: 83
End: 2023-02-28
Payer: MEDICARE

## 2023-03-23 ENCOUNTER — APPOINTMENT (OUTPATIENT)
Dept: INTERNAL MEDICINE | Facility: CLINIC | Age: 83
End: 2023-03-23
Payer: MEDICARE

## 2023-03-23 VITALS
SYSTOLIC BLOOD PRESSURE: 162 MMHG | WEIGHT: 179 LBS | TEMPERATURE: 97 F | OXYGEN SATURATION: 95 % | DIASTOLIC BLOOD PRESSURE: 84 MMHG | RESPIRATION RATE: 16 BRPM | HEIGHT: 64 IN | HEART RATE: 96 BPM | BODY MASS INDEX: 30.56 KG/M2

## 2023-03-23 DIAGNOSIS — R39.81 FUNCTIONAL URINARY INCONTINENCE: ICD-10-CM

## 2023-03-23 PROCEDURE — 99214 OFFICE O/P EST MOD 30 MIN: CPT

## 2023-03-23 NOTE — ASSESSMENT
[FreeTextEntry1] : 83 year old female found to have stable Essential Hypertension, Type 2 Diabetes Mellitus with hyperglycemia, Hypercholesterolemia with Hypertriglyceridemia, Atrial Fibrillation, Chronic Renal Failure stage -4, Arthritis,with the current prescription regimen as recommended, diet and life style modifications, as counseled. Prior results reviewed, interpreted and discussed with the patient during today's examination, as appropriate. Follow up, treatment plan and tests, as ordered.\par \par Total time spent : 30 minutes\par Including:\par Preparation prior to visit - Reviewing prior record, results of tests and Consultation Reports as applicable\par Conducting an appropriate H & P during today's encounter\par Appropriate orders for tests, medications and procedures, as applicable\par Counseling patient \par Note completion\par

## 2023-03-23 NOTE — HISTORY OF PRESENT ILLNESS
[de-identified] : 83 year old  female patient with history of stable Essential Hypertension, Type 2 Diabetes Mellitus with hyperglycemia, Hypercholesterolemia with Hypertriglyceridemia, Chronic Renal Failure stage -4, Arthritis, Primary Hyperaldosteronism, history as stated, presented for follow up examination. Patient is compliant with all medications. Denies shortness of breath, chest pain or abdominal pains at this time. ROS as stated.\par

## 2023-03-24 ENCOUNTER — RX RENEWAL (OUTPATIENT)
Age: 83
End: 2023-03-24

## 2023-04-20 ENCOUNTER — APPOINTMENT (OUTPATIENT)
Dept: OTOLARYNGOLOGY | Facility: CLINIC | Age: 83
End: 2023-04-20
Payer: MEDICARE

## 2023-04-20 VITALS
HEART RATE: 88 BPM | DIASTOLIC BLOOD PRESSURE: 79 MMHG | SYSTOLIC BLOOD PRESSURE: 148 MMHG | WEIGHT: 179 LBS | HEIGHT: 64 IN | BODY MASS INDEX: 30.56 KG/M2

## 2023-04-20 DIAGNOSIS — R04.0 EPISTAXIS: ICD-10-CM

## 2023-04-20 DIAGNOSIS — J34.89 OTHER SPECIFIED DISORDERS OF NOSE AND NASAL SINUSES: ICD-10-CM

## 2023-04-20 PROCEDURE — 99203 OFFICE O/P NEW LOW 30 MIN: CPT | Mod: 25

## 2023-04-20 PROCEDURE — 31231 NASAL ENDOSCOPY DX: CPT

## 2023-04-20 RX ORDER — OFLOXACIN 3 MG/ML
0.3 SOLUTION/ DROPS OPHTHALMIC
Qty: 10 | Refills: 0 | Status: ACTIVE | COMMUNITY
Start: 2022-07-22

## 2023-04-20 RX ORDER — BROMFENAC SODIUM 0.7 MG/ML
0.07 SOLUTION/ DROPS OPHTHALMIC
Qty: 3 | Refills: 0 | Status: ACTIVE | COMMUNITY
Start: 2022-03-15

## 2023-04-20 RX ORDER — LATANOPROST/PF 0.005 %
0.01 DROPS OPHTHALMIC (EYE)
Qty: 8 | Refills: 0 | Status: ACTIVE | COMMUNITY
Start: 2022-09-12

## 2023-04-20 NOTE — PHYSICAL EXAM
[Nasal Endoscopy Performed] : nasal endoscopy was performed, see procedure section for findings [] : septum deviated bilaterally [Normal] : mucosa is normal [Midline] : trachea located in midline position

## 2023-04-25 PROBLEM — J34.89 NASAL DRYNESS: Status: ACTIVE | Noted: 2023-04-25

## 2023-04-25 RX ORDER — SOD CHLOR,BICARB/SQUEEZ BOTTLE
PACKET, WITH RINSE DEVICE NASAL
Qty: 1 | Refills: 3 | Status: ACTIVE | COMMUNITY
Start: 2023-04-25 | End: 1900-01-01

## 2023-04-25 NOTE — ASSESSMENT
[FreeTextEntry1] : 83Y F presents with right epistaxis. Patient current on Eliquis for afib. Patient is naive to sinus rinse or nasal gel. Patient currently using humidifier\par \par Nasal endoscopy shows Right blood clog drainage from middle meatus. No Active bleeding. No visible vessel as source of bleed\par \par Recommend:\par Recurrent Epistaxis\par - Discussed need to increased moisture therapy with saline spray and gel, along with the application technique and what to do if epistaxis. \par - Start Nasal Saline Spray/Gel ( (e.g. Ocean Spray Nasal Saline, AYR Nasal Gel) to both anterior nares. Spray/Gel should be use at least 3-4X daily. You can't overuse saltwater spray.\par - Most nosebleeds start from the front of the nose on the septum (the part of the nose that divides it into a right and left side).  The skin on the septum can dry out and crack, exposing blood vessels which then bleed. \par \par - Discussed how to manage active nose bleed:\par 1) Lean forward to prevent swallowing blood.  Swallowing too much blood can make you sick and vomit. This also prevents possible aspiration of blood\par 2) Spray Afrin (oxymetazoline) an over-the-counter nasal decongestant that some people use to help stop ACTIVE bleeding. PLEASE BE CAUTIONED: IT SHOULD NOT BE USED more than 3 days or if you have heart or blood pressure issues.  \par 3) Pinch the nostrils closed with moderate pressure for 5-10 minutes after spraying Afrin.\par 4) If still bleeding spray Afrin again and give it another 5-10 minutes.\par 5) If the amount of bleeding cannot be control it at home, please go to the emergency room.\par \par -Return to clinic in 1 months or sooner if new/worsen symptoms present\par

## 2023-04-25 NOTE — HISTORY OF PRESENT ILLNESS
[de-identified] : 83 year old female presents for initial evaluation of right-sided epistaxis that started about 2 months ago. \par Right-sided nose bleeding lasting about 5 minutes with pressure\par Never has had cauterization done. \par Denies any trauma to the nose . \par States she has been on Eliquis for 3 years without any issues. \par Denies history of bleeding disorders. Denies family history of bleeding disorders. \par Denies nasal congestion, sinus pain/pressure, post nasal drip, recent fevers or sinus infections. \par Denies use of nasal sprays or OTC allergy medication.

## 2023-04-26 ENCOUNTER — APPOINTMENT (OUTPATIENT)
Dept: UROLOGY | Facility: CLINIC | Age: 83
End: 2023-04-26
Payer: MEDICARE

## 2023-04-26 DIAGNOSIS — N39.41 URGE INCONTINENCE: ICD-10-CM

## 2023-04-26 DIAGNOSIS — R39.15 URGENCY OF URINATION: ICD-10-CM

## 2023-04-26 PROCEDURE — 99204 OFFICE O/P NEW MOD 45 MIN: CPT

## 2023-04-27 PROBLEM — R39.15 URINARY URGENCY: Status: ACTIVE | Noted: 2023-04-27

## 2023-04-27 PROBLEM — N39.41 URGE INCONTINENCE OF URINE: Status: ACTIVE | Noted: 2023-04-27

## 2023-04-27 NOTE — HISTORY OF PRESENT ILLNESS
[Currently Experiencing ___] :  [unfilled] [Urinary Incontinence] : urinary incontinence [Nocturia] : nocturia [None] : None [FreeTextEntry1] : Ms. Jim is a very pleasant 83 year old woman here today for incontinence and urinary urgency.\par She was referred by her PCP Dr. Edmond.\par She reports that this has been going on for the past few years and has progressively worsened.\par She reports that she'll have an extremely strong urge, she'll stand and then will not make it to the bathroom in time.\par Reports that she does not void completely before making it to the bathroom.\par Nocturia x2-3 that is somewhat bothersome.\par Drinks low caffeine intake.\par Reports she does drink alcohol and high spicy food intake.\par Denies VALDEMAR.\par Denies any hematuria, dysuria or urinary retention.\par Denies any personal or family history of kidney stones.\par Denies any family history of urological cancers.\par Former smoker, quit over 50 years ago.\par \par

## 2023-04-27 NOTE — ASSESSMENT
[FreeTextEntry1] : Ms. Jim is a very pleasant 83 year old woman here today for urge incontinence, urinary urgency.\par Creatinine 1.9.\par HgbA1c 6.7%.\par We had an extensive discussion regarding potential options for management of urinary urgency and urge incontinence.  At this time she would like to defer pharmacological intervention.\par Advised to limit caffeine intake.\par Advised to limit alcohol intake.\par Limit spicy food intake.\par Voiding schedule.\par We discussed that Kegels may not help with this type of incontinence but she can try to see if this helps.\par Panty liners.\par RTO in 3 months.\par \par

## 2023-05-02 ENCOUNTER — APPOINTMENT (OUTPATIENT)
Dept: CARDIOLOGY | Facility: CLINIC | Age: 83
End: 2023-05-02
Payer: MEDICARE

## 2023-05-02 VITALS
SYSTOLIC BLOOD PRESSURE: 166 MMHG | OXYGEN SATURATION: 93 % | HEIGHT: 64 IN | HEART RATE: 119 BPM | DIASTOLIC BLOOD PRESSURE: 88 MMHG | BODY MASS INDEX: 30.56 KG/M2 | WEIGHT: 179 LBS | TEMPERATURE: 96.6 F

## 2023-05-02 PROCEDURE — 99214 OFFICE O/P EST MOD 30 MIN: CPT

## 2023-05-03 NOTE — HISTORY OF PRESENT ILLNESS
[FreeTextEntry1] : 83-year-old female with HTN, chronic A. fib on Eliquis, CKD, COVID-19, T2DM, with echocardiogram 08/2021 showing mild LVH and normal EF, who presents for follow-up visit. \par \par Patient reports that for the past several months she has started getting nosebleeds.  She pinches her nose for about 5 minutes to stop the bleeding.  There are no other types of bleeds, no blood in the toilet, etc.  Patient has seen an ENT for this and may need to undergo additional procedures if she continues to have these episodes.  \par \par Patient reports that she feels fine otherwise and denies any chest pain, dyspnea, palpitations, lightheadedness, or syncope.  Her blood pressure runs in the 130s over 80s at home, with a pulse in the 80s to 90s based on logs that patient has brought in.  Patient is able to ascend 5 stairs to get into her building, without symptoms.  Her activity levels are limited due to her hip and knee pain.\par \par \par \par \par \par

## 2023-05-03 NOTE — ASSESSMENT
[FreeTextEntry1] : 83-year-old female with HTN, chronic A. fib on Eliquis, CKD, COVID-19, T2DM, with echocardiogram 08/2021 showing mild LVH and normal EF who presents for follow-up visit. \par \par 1. HTN: Seems well controlled at home on amlodipine, atenolol, chlorthalidone, telmisartan, and spironolactone\par -Medications adjusted by her nephrologist\par \par 2. Paroxysmal AFib: On Eliquis 2.5mg PO BID (age > 80, creatinine > 1.5)\par -Rate controlled on atenolol\par –Discussed with patient that given the risk of stroke due to her [id number] score of 5, the best option will be for her to continue taking Eliquis in the long-term if her bleeding can be controlled.  She states she will discuss further with her ENT.\par \par 3. HLD: Patient is out of benefit range of statin for primary prevention due to age > 75.\par \par 4.  Preprocedural evaluation prior to ENT surgery for epistaxis: Patient's Revised Cardiac Risk Index (RCRI) score is 1 (Class II risk) and Palma score is 1.47% risk. Patient is at intermediate risk of  adverse perioperative cardiac events undergoing intermediate risk surgery. No further cardiac testing is needed prior to patient's surgery.\par –Eliquis can be held 3 days prior to surgery and resumed when safe to do so from the surgical perspective with respect to postoperative bleeding risk.\par –Atenolol should be continued perioperatively\par \par \par FUV 6 months or PRN

## 2023-05-18 ENCOUNTER — APPOINTMENT (OUTPATIENT)
Dept: OTOLARYNGOLOGY | Facility: CLINIC | Age: 83
End: 2023-05-18

## 2023-06-28 ENCOUNTER — RX RENEWAL (OUTPATIENT)
Age: 83
End: 2023-06-28

## 2023-06-29 ENCOUNTER — RX RENEWAL (OUTPATIENT)
Age: 83
End: 2023-06-29

## 2023-07-23 NOTE — COUNSELING
Patient is scheduled with Dr. Lopez on 7/29 at 8:45  Called patient to see if we could move her to 11:45 but Tues thru Friday she starts work at 12:30 and would not be able to do 11:45 appointment.     Please advise on where to reschedule this patient.  Thank you.   [Benefits of weight loss discussed] : Benefits of weight loss discussed [Good understanding] : Patient has a good understanding of disease, goals and obesity follow-up plan

## 2023-07-24 ENCOUNTER — APPOINTMENT (OUTPATIENT)
Dept: INTERNAL MEDICINE | Facility: CLINIC | Age: 83
End: 2023-07-24
Payer: MEDICARE

## 2023-07-24 VITALS
RESPIRATION RATE: 14 BRPM | HEART RATE: 88 BPM | DIASTOLIC BLOOD PRESSURE: 90 MMHG | SYSTOLIC BLOOD PRESSURE: 155 MMHG | TEMPERATURE: 98.3 F | BODY MASS INDEX: 30.73 KG/M2 | WEIGHT: 180 LBS | OXYGEN SATURATION: 96 % | HEIGHT: 64 IN

## 2023-07-24 PROCEDURE — 36415 COLL VENOUS BLD VENIPUNCTURE: CPT

## 2023-07-24 PROCEDURE — 99214 OFFICE O/P EST MOD 30 MIN: CPT | Mod: 25

## 2023-07-24 NOTE — HISTORY OF PRESENT ILLNESS
[de-identified] : 83 year old  female patient with history of stable Essential Hypertension, Type 2 Diabetes Mellitus with hyperglycemia, Hypercholesterolemia with Hypertriglyceridemia, Chronic Renal Failure stage -4, Arthritis, Primary Hyperaldosteronism, history as stated, presented for follow up examination. Patient is compliant with all medications. Denies shortness of breath, chest pain or abdominal pains at this time. ROS as stated.\par

## 2023-07-24 NOTE — ASSESSMENT
[FreeTextEntry1] : 83 year old female found to have stable Essential Hypertension, Type 2 Diabetes Mellitus with hyperglycemia, Hypercholesterolemia with Hypertriglyceridemia, Atrial Fibrillation, Chronic Renal Failure stage -4, Arthritis, Primary Hyperaldosteronism, with the current prescription regimen as recommended, diet and life style modifications, as counseled. Prior results reviewed, interpreted and discussed with the patient during today's examination, as appropriate. Follow up, treatment plan and tests, as ordered.\par \par Total time spent : 30 minutes\par Including:\par Preparation prior to visit - Reviewing prior record, results of tests and Consultation Reports as applicable\par Conducting an appropriate H & P during today's encounter\par Appropriate orders for tests, medications and procedures, as applicable\par Counseling patient \par Note completion\par

## 2023-07-24 NOTE — HEALTH RISK ASSESSMENT
[No] : In the past 12 months have you used drugs other than those required for medical reasons? No [No falls in past year] : Patient reported no falls in the past year [0] : 2) Feeling down, depressed, or hopeless: Not at all (0) [Never] : Never [de-identified] : CARD/URO [[id number]] : 0

## 2023-07-26 ENCOUNTER — APPOINTMENT (OUTPATIENT)
Dept: UROLOGY | Facility: CLINIC | Age: 83
End: 2023-07-26

## 2023-07-26 LAB
25(OH)D3 SERPL-MCNC: 70.6 NG/ML
ALBUMIN SERPL ELPH-MCNC: 4.5 G/DL
ALP BLD-CCNC: 47 U/L
ALT SERPL-CCNC: 10 U/L
ANION GAP SERPL CALC-SCNC: 14 MMOL/L
APPEARANCE: CLEAR
AST SERPL-CCNC: 10 U/L
BACTERIA: NEGATIVE /HPF
BILIRUB SERPL-MCNC: 0.4 MG/DL
BILIRUBIN URINE: NEGATIVE
BLOOD URINE: NEGATIVE
BUN SERPL-MCNC: 67 MG/DL
CALCIUM SERPL-MCNC: 9.9 MG/DL
CAST: 0 /LPF
CHLORIDE SERPL-SCNC: 105 MMOL/L
CHOLEST SERPL-MCNC: 209 MG/DL
CO2 SERPL-SCNC: 19 MMOL/L
COLOR: YELLOW
CREAT SERPL-MCNC: 2.24 MG/DL
CREAT SPEC-SCNC: 185 MG/DL
EGFR: 21 ML/MIN/1.73M2
EPITHELIAL CELLS: 10 /HPF
ESTIMATED AVERAGE GLUCOSE: 148 MG/DL
GGT SERPL-CCNC: 20 U/L
GLUCOSE QUALITATIVE U: NEGATIVE MG/DL
GLUCOSE SERPL-MCNC: 138 MG/DL
HBA1C MFR BLD HPLC: 6.8 %
HDLC SERPL-MCNC: 48 MG/DL
KETONES URINE: NEGATIVE MG/DL
LDLC SERPL CALC-MCNC: 143 MG/DL
LEUKOCYTE ESTERASE URINE: ABNORMAL
MICROALBUMIN 24H UR DL<=1MG/L-MCNC: 3.8 MG/DL
MICROALBUMIN/CREAT 24H UR-RTO: 21 MG/G
MICROSCOPIC-UA: NORMAL
NITRITE URINE: NEGATIVE
NONHDLC SERPL-MCNC: 161 MG/DL
PH URINE: 5
POTASSIUM SERPL-SCNC: 5.2 MMOL/L
PROT SERPL-MCNC: 7.1 G/DL
PROTEIN URINE: NEGATIVE MG/DL
RED BLOOD CELLS URINE: 2 /HPF
REVIEW: NORMAL
SODIUM SERPL-SCNC: 138 MMOL/L
SPECIFIC GRAVITY URINE: 1.02
TRIGL SERPL-MCNC: 99 MG/DL
TSH SERPL-ACNC: 2.03 UIU/ML
URATE SERPL-MCNC: 7.7 MG/DL
UROBILINOGEN URINE: 0.2 MG/DL
WHITE BLOOD CELLS URINE: 7 /HPF

## 2023-08-12 ENCOUNTER — RX RENEWAL (OUTPATIENT)
Age: 83
End: 2023-08-12

## 2023-08-12 RX ORDER — ALLOPURINOL 100 MG/1
100 TABLET ORAL
Qty: 90 | Refills: 3 | Status: ACTIVE | COMMUNITY
Start: 2022-09-29 | End: 1900-01-01

## 2023-08-31 ENCOUNTER — APPOINTMENT (OUTPATIENT)
Age: 83
End: 2023-08-31
Payer: MEDICARE

## 2023-08-31 VITALS — HEIGHT: 62.6 IN | BODY MASS INDEX: 33.37 KG/M2 | WEIGHT: 186 LBS

## 2023-08-31 VITALS — DIASTOLIC BLOOD PRESSURE: 89 MMHG | SYSTOLIC BLOOD PRESSURE: 155 MMHG | HEART RATE: 111 BPM | OXYGEN SATURATION: 93 %

## 2023-08-31 DIAGNOSIS — Z87.891 PERSONAL HISTORY OF NICOTINE DEPENDENCE: ICD-10-CM

## 2023-08-31 DIAGNOSIS — R23.8 OTHER SKIN CHANGES: ICD-10-CM

## 2023-08-31 PROCEDURE — 99203 OFFICE O/P NEW LOW 30 MIN: CPT

## 2023-08-31 RX ORDER — TRIAMCINOLONE ACETONIDE 1 MG/G
0.1 OINTMENT TOPICAL
Qty: 1 | Refills: 1 | Status: ACTIVE | COMMUNITY
Start: 2023-08-31 | End: 1900-01-01

## 2023-08-31 NOTE — PHYSICAL EXAM
[Alert] : alert [Oriented to Person] : oriented to person [Oriented to Place] : oriented to place [Oriented to Time] : oriented to time [Calm] : calm [de-identified] : A/Ox3; NAD. appears comfortable [de-identified] : EOMI; sclera anicteric. [de-identified] : no cervical lymphadenopathy  [de-identified] : airway patent, no use of accessory muscles [de-identified] : irritated skin lesion, underneath L breast w multiple skin growths noted, seborrheic keratosis  [de-identified] : abd is soft, NT/ND [de-identified] : as noted above..

## 2023-08-31 NOTE — HISTORY OF PRESENT ILLNESS
[de-identified] : Ms. LEMOS is a 83 year y/o F w PMH HTN, Type 2 Diabetes Mellitus with hyperglycemia, HLD Atrial Fibrillation, Chronic Renal Failure stage -4, Arthritis, Primary Hyperaldosteronism, referred to the office for consultation visit for skin lesion w some irritation underneath the left breast. Patient c/o itching to the area.

## 2023-08-31 NOTE — PLAN
[FreeTextEntry1] : surgical intervention not recommended at this time apply triamcinolone to the affected area, as needed.  patient will follow up if needed.  Patient's questions and concerns addressed to their satisfaction, and patient verbalized an understanding of the information discussed.

## 2023-08-31 NOTE — CONSULT LETTER
[Dear  ___] : Dear  [unfilled], [Consult Letter:] : I had the pleasure of evaluating your patient, [unfilled]. [Consult Closing:] : Thank you very much for allowing me to participate in the care of this patient.  If you have any questions, please do not hesitate to contact me. [Sincerely,] : Sincerely, [FreeTextEntry3] : Azam Gutierrez MD

## 2023-10-17 ENCOUNTER — NON-APPOINTMENT (OUTPATIENT)
Age: 83
End: 2023-10-17

## 2023-10-17 ENCOUNTER — APPOINTMENT (OUTPATIENT)
Dept: CARDIOLOGY | Facility: CLINIC | Age: 83
End: 2023-10-17
Payer: MEDICARE

## 2023-10-17 VITALS
TEMPERATURE: 97.5 F | SYSTOLIC BLOOD PRESSURE: 124 MMHG | HEIGHT: 62.6 IN | WEIGHT: 182 LBS | HEART RATE: 109 BPM | OXYGEN SATURATION: 96 % | BODY MASS INDEX: 32.65 KG/M2 | DIASTOLIC BLOOD PRESSURE: 85 MMHG

## 2023-10-17 DIAGNOSIS — Z80.8 FAMILY HISTORY OF MALIGNANT NEOPLASM OF OTHER ORGANS OR SYSTEMS: ICD-10-CM

## 2023-10-17 PROCEDURE — 99214 OFFICE O/P EST MOD 30 MIN: CPT

## 2023-10-17 PROCEDURE — 93000 ELECTROCARDIOGRAM COMPLETE: CPT

## 2023-10-18 PROBLEM — Z80.8 FAMILY HISTORY OF MALIGNANT NEOPLASM OF SKIN: Status: ACTIVE | Noted: 2023-08-31

## 2023-10-19 ENCOUNTER — APPOINTMENT (OUTPATIENT)
Age: 83
End: 2023-10-19
Payer: MEDICARE

## 2023-10-19 VITALS
WEIGHT: 182 LBS | DIASTOLIC BLOOD PRESSURE: 84 MMHG | HEIGHT: 62.6 IN | HEART RATE: 109 BPM | BODY MASS INDEX: 32.65 KG/M2 | SYSTOLIC BLOOD PRESSURE: 148 MMHG

## 2023-10-19 DIAGNOSIS — Z85.828 PERSONAL HISTORY OF OTHER MALIGNANT NEOPLASM OF SKIN: ICD-10-CM

## 2023-10-19 PROCEDURE — 99213 OFFICE O/P EST LOW 20 MIN: CPT

## 2023-11-30 ENCOUNTER — LABORATORY RESULT (OUTPATIENT)
Age: 83
End: 2023-11-30

## 2023-11-30 ENCOUNTER — APPOINTMENT (OUTPATIENT)
Dept: INTERNAL MEDICINE | Facility: CLINIC | Age: 83
End: 2023-11-30
Payer: MEDICARE

## 2023-11-30 VITALS
OXYGEN SATURATION: 96 % | HEIGHT: 62.6 IN | WEIGHT: 185 LBS | SYSTOLIC BLOOD PRESSURE: 124 MMHG | HEART RATE: 98 BPM | TEMPERATURE: 97.9 F | DIASTOLIC BLOOD PRESSURE: 84 MMHG | BODY MASS INDEX: 33.19 KG/M2 | RESPIRATION RATE: 14 BRPM

## 2023-11-30 DIAGNOSIS — N18.4 CHRONIC KIDNEY DISEASE, STAGE 4 (SEVERE): ICD-10-CM

## 2023-11-30 DIAGNOSIS — E11.65 TYPE 2 DIABETES MELLITUS WITH HYPERGLYCEMIA: ICD-10-CM

## 2023-11-30 DIAGNOSIS — M19.90 UNSPECIFIED OSTEOARTHRITIS, UNSPECIFIED SITE: ICD-10-CM

## 2023-11-30 DIAGNOSIS — E26.09 OTHER PRIMARY HYPERALDOSTERONISM: ICD-10-CM

## 2023-11-30 PROCEDURE — 99214 OFFICE O/P EST MOD 30 MIN: CPT | Mod: 25

## 2023-11-30 PROCEDURE — 36415 COLL VENOUS BLD VENIPUNCTURE: CPT

## 2023-12-01 LAB
ALBUMIN SERPL ELPH-MCNC: 4 G/DL
ALP BLD-CCNC: 47 U/L
ALT SERPL-CCNC: 9 U/L
ANION GAP SERPL CALC-SCNC: 15 MMOL/L
AST SERPL-CCNC: 9 U/L
BASOPHILS # BLD AUTO: 0.07 K/UL
BASOPHILS NFR BLD AUTO: 0.6 %
BILIRUB SERPL-MCNC: 0.7 MG/DL
BUN SERPL-MCNC: 48 MG/DL
CALCIUM SERPL-MCNC: 9.7 MG/DL
CHLORIDE SERPL-SCNC: 102 MMOL/L
CHOLEST SERPL-MCNC: 192 MG/DL
CO2 SERPL-SCNC: 22 MMOL/L
CREAT SERPL-MCNC: 2.19 MG/DL
EGFR: 22 ML/MIN/1.73M2
EOSINOPHIL # BLD AUTO: 0.23 K/UL
EOSINOPHIL NFR BLD AUTO: 2.1 %
ESTIMATED AVERAGE GLUCOSE: 143 MG/DL
GGT SERPL-CCNC: 15 U/L
GLUCOSE SERPL-MCNC: 158 MG/DL
HBA1C MFR BLD HPLC: 6.6 %
HCT VFR BLD CALC: 45.2 %
HDLC SERPL-MCNC: 42 MG/DL
HGB BLD-MCNC: 14.3 G/DL
IMM GRANULOCYTES NFR BLD AUTO: 0.3 %
LDLC SERPL CALC-MCNC: 130 MG/DL
LYMPHOCYTES # BLD AUTO: 1.55 K/UL
LYMPHOCYTES NFR BLD AUTO: 14.3 %
MAN DIFF?: NORMAL
MCHC RBC-ENTMCNC: 31.6 GM/DL
MCHC RBC-ENTMCNC: 33.3 PG
MCV RBC AUTO: 105.4 FL
MONOCYTES # BLD AUTO: 0.98 K/UL
MONOCYTES NFR BLD AUTO: 9 %
NEUTROPHILS # BLD AUTO: 7.98 K/UL
NEUTROPHILS NFR BLD AUTO: 73.7 %
NONHDLC SERPL-MCNC: 149 MG/DL
PLATELET # BLD AUTO: 246 K/UL
POTASSIUM SERPL-SCNC: 5.5 MMOL/L
PROT SERPL-MCNC: 6.7 G/DL
RBC # BLD: 4.29 M/UL
RBC # FLD: 13.4 %
SODIUM SERPL-SCNC: 140 MMOL/L
TRIGL SERPL-MCNC: 107 MG/DL
WBC # FLD AUTO: 10.84 K/UL

## 2023-12-15 ENCOUNTER — RX RENEWAL (OUTPATIENT)
Age: 83
End: 2023-12-15

## 2023-12-15 RX ORDER — SPIRONOLACTONE 25 MG/1
25 TABLET ORAL DAILY
Qty: 90 | Refills: 3 | Status: ACTIVE | COMMUNITY
Start: 2017-11-30 | End: 1900-01-01

## 2023-12-15 RX ORDER — CHLORTHALIDONE 25 MG/1
25 TABLET ORAL
Qty: 90 | Refills: 3 | Status: ACTIVE | COMMUNITY
Start: 2017-11-30 | End: 1900-01-01

## 2024-01-02 ENCOUNTER — RX RENEWAL (OUTPATIENT)
Age: 84
End: 2024-01-02

## 2024-01-11 ENCOUNTER — RX RENEWAL (OUTPATIENT)
Age: 84
End: 2024-01-11

## 2024-01-11 RX ORDER — AMLODIPINE BESYLATE 5 MG/1
5 TABLET ORAL
Qty: 90 | Refills: 3 | Status: ACTIVE | COMMUNITY
Start: 2018-03-23 | End: 1900-01-01

## 2024-02-29 ENCOUNTER — RX RENEWAL (OUTPATIENT)
Age: 84
End: 2024-02-29

## 2024-02-29 ENCOUNTER — APPOINTMENT (OUTPATIENT)
Dept: INTERNAL MEDICINE | Facility: CLINIC | Age: 84
End: 2024-02-29

## 2024-03-17 NOTE — PHYSICAL EXAM
[TextEntry] : PULMONARY: No respiratory distress and lungs were clear to auscultation bilaterally. HEART: Heart rate was normal and rhythm regular, normal S1 and S2, no gallops/murmur/pericardial rub. VASCULAR: No peripheral edema. ABDOMEN: Normal bowel sounds, soft, non-tender, no hepatosplenomegaly and no abdominal mass palpated. NEUROLOGICAL: Poor balance and coordination noted, using walker with seat for safety.

## 2024-03-17 NOTE — ASSESSMENT
[FreeTextEntry1] : 84 year old female found to have stable Essential Hypertension, Type 2 Diabetes Mellitus with hyperglycemia, Hypercholesterolemia with Hypertriglyceridemia, Chronic Renal Failure stage -4, Arthritis, Primary Hyperaldosteronism, with the current prescription regimen as recommended, diet and lifestyle modifications, as counseled. Prior results reviewed, interpreted and discussed with the patient during today's examination, as appropriate. Follow up, treatment plan and tests, as ordered.   Total time spent:: 30 minutes Including: Preparation prior to visit - Reviewing prior record, results of tests and Consultation Reports as applicable Conducting an appropriate H & P during today's encounter Appropriate orders for tests, medications and procedures, as applicable Counseling patient Note completion

## 2024-03-17 NOTE — HEALTH RISK ASSESSMENT
[No] : In the past 12 months have you used drugs other than those required for medical reasons? No [No falls in past year] : Patient reported no falls in the past year [0] : 2) Feeling down, depressed, or hopeless: Not at all (0) [Never] : Never [de-identified] : None [[id number]] : 0

## 2024-03-17 NOTE — HISTORY OF PRESENT ILLNESS
[de-identified] : 84 year old  female patient with history of stable Essential Hypertension, Type 2 Diabetes Mellitus with hyperglycemia, Hypercholesterolemia with Hypertriglyceridemia, Chronic Renal Failure stage -4, Arthritis, Primary Hyperaldosteronism, history as stated, presented for follow up examination. Patient is compliant with all medications. ROS as stated.

## 2024-03-19 ENCOUNTER — APPOINTMENT (OUTPATIENT)
Dept: INTERNAL MEDICINE | Facility: CLINIC | Age: 84
End: 2024-03-19

## 2024-04-02 ENCOUNTER — APPOINTMENT (OUTPATIENT)
Dept: INTERNAL MEDICINE | Facility: CLINIC | Age: 84
End: 2024-04-02
Payer: MEDICARE

## 2024-04-02 VITALS
TEMPERATURE: 97 F | DIASTOLIC BLOOD PRESSURE: 80 MMHG | WEIGHT: 187 LBS | HEIGHT: 62 IN | OXYGEN SATURATION: 95 % | RESPIRATION RATE: 16 BRPM | BODY MASS INDEX: 34.41 KG/M2 | SYSTOLIC BLOOD PRESSURE: 150 MMHG | HEART RATE: 92 BPM

## 2024-04-02 VITALS
RESPIRATION RATE: 14 BRPM | HEART RATE: 104 BPM | SYSTOLIC BLOOD PRESSURE: 162 MMHG | HEIGHT: 62 IN | OXYGEN SATURATION: 95 % | DIASTOLIC BLOOD PRESSURE: 80 MMHG | TEMPERATURE: 97 F | BODY MASS INDEX: 34.41 KG/M2 | WEIGHT: 187 LBS

## 2024-04-02 DIAGNOSIS — Z00.00 ENCOUNTER FOR GENERAL ADULT MEDICAL EXAMINATION W/OUT ABNORMAL FINDINGS: ICD-10-CM

## 2024-04-02 PROCEDURE — 36415 COLL VENOUS BLD VENIPUNCTURE: CPT

## 2024-04-02 PROCEDURE — G0439: CPT

## 2024-04-02 NOTE — HEALTH RISK ASSESSMENT
[de-identified] : None [[id number]] : 0 [Change in mental status noted] : No change in mental status noted [Reports changes in hearing] : Reports no changes in hearing [Reports changes in vision] : Reports no changes in vision [Reports changes in dental health] : Reports no changes in dental health [BoneDensityDate] : 09/21 [AdvancecareDate] : 04/24

## 2024-04-02 NOTE — PHYSICAL EXAM
[TextEntry] : CONSTITUTIONAL:  No acute distress, well developed and well appearing. EYES:  Normal sclera/conjunctiva, PERRLA, EOMI. ENT:  Normal outer ears/nose, normal oropharynx. NECK:  No JVD, supple, thyroid normal/no nodules, no lymphadenopathy. PULMONARY:  No respiratory distress, clear to auscultation, no accessory muscle use. CARDIAC:  Normal rate, regular rhythm, normal S1/S2, no murmur. VASCULAR:  No carotid bruits, no abdominal bruit, no varicosities, pedal pulses present, no edema, no extremity clubbing/cyanosis. ABDOMEN:  Normoactive bowel sounds, soft and nontender, no hepatosplenomegaly or masses appreciated. BACK:  No CVA tenderness, no spinal tenderness. MUSCULOSKELETAL:  No joint swelling, grossly normal strength/tone. SKIN:  No rash, normal turgor. NEUROLOGY:  Poor balance and coordination noted, using walker with seat for safety.

## 2024-04-02 NOTE — ASSESSMENT
[FreeTextEntry1] : 84 year old female found to have stable Essential Hypertension, Type 2 Diabetes Mellitus with hyperglycemia, Hypercholesterolemia with Hypertriglyceridemia, Chronic Renal Failure stage -4, Arthritis, Primary Hyperaldosteronism, with the current prescription regimen as recommended, diet and lifestyle modifications, as counseled. Prior results reviewed, interpreted and discussed with the patient during today's examination, as appropriate. Follow up, treatment plan and tests, as ordered.

## 2024-04-02 NOTE — HISTORY OF PRESENT ILLNESS
[de-identified] : 84 year old female patient with history of stable Essential Hypertension, Type 2 Diabetes Mellitus with hyperglycemia, Hypercholesterolemia with Hypertriglyceridemia, Chronic Renal Failure stage -4, Arthritis, Primary Hyperaldosteronism, history as stated, presented for an annual preventative examination.

## 2024-04-03 LAB
ALBUMIN SERPL ELPH-MCNC: 4.1 G/DL
ALP BLD-CCNC: 47 U/L
ALT SERPL-CCNC: 9 U/L
ANION GAP SERPL CALC-SCNC: 15 MMOL/L
AST SERPL-CCNC: 12 U/L
BASOPHILS # BLD AUTO: 0.08 K/UL
BASOPHILS NFR BLD AUTO: 0.8 %
BILIRUB SERPL-MCNC: 0.8 MG/DL
BUN SERPL-MCNC: 44 MG/DL
CALCIUM SERPL-MCNC: 9.5 MG/DL
CHLORIDE SERPL-SCNC: 108 MMOL/L
CHOLEST SERPL-MCNC: 190 MG/DL
CO2 SERPL-SCNC: 18 MMOL/L
CREAT SERPL-MCNC: 2.02 MG/DL
EGFR: 24 ML/MIN/1.73M2
EOSINOPHIL # BLD AUTO: 0.25 K/UL
EOSINOPHIL NFR BLD AUTO: 2.5 %
ESTIMATED AVERAGE GLUCOSE: 148 MG/DL
GGT SERPL-CCNC: 17 U/L
GLUCOSE SERPL-MCNC: 146 MG/DL
HBA1C MFR BLD HPLC: 6.8 %
HCT VFR BLD CALC: 43.3 %
HDLC SERPL-MCNC: 42 MG/DL
HGB BLD-MCNC: 14.2 G/DL
IMM GRANULOCYTES NFR BLD AUTO: 0.3 %
LDLC SERPL CALC-MCNC: 135 MG/DL
LYMPHOCYTES # BLD AUTO: 1.89 K/UL
LYMPHOCYTES NFR BLD AUTO: 19.3 %
MAN DIFF?: NORMAL
MCHC RBC-ENTMCNC: 32.8 GM/DL
MCHC RBC-ENTMCNC: 34.1 PG
MCV RBC AUTO: 104.1 FL
MONOCYTES # BLD AUTO: 1 K/UL
MONOCYTES NFR BLD AUTO: 10.2 %
NEUTROPHILS # BLD AUTO: 6.56 K/UL
NEUTROPHILS NFR BLD AUTO: 66.9 %
NONHDLC SERPL-MCNC: 148 MG/DL
PLATELET # BLD AUTO: 230 K/UL
POTASSIUM SERPL-SCNC: 4.8 MMOL/L
PROT SERPL-MCNC: 6.7 G/DL
RBC # BLD: 4.16 M/UL
RBC # FLD: 14.7 %
SODIUM SERPL-SCNC: 142 MMOL/L
TRIGL SERPL-MCNC: 69 MG/DL
URATE SERPL-MCNC: 8.1 MG/DL
WBC # FLD AUTO: 9.81 K/UL

## 2024-04-12 ENCOUNTER — RX RENEWAL (OUTPATIENT)
Age: 84
End: 2024-04-12

## 2024-04-12 RX ORDER — TELMISARTAN 80 MG/1
80 TABLET ORAL
Qty: 90 | Refills: 3 | Status: ACTIVE | COMMUNITY
Start: 2019-04-12 | End: 1900-01-01

## 2024-04-22 ENCOUNTER — RX RENEWAL (OUTPATIENT)
Age: 84
End: 2024-04-22

## 2024-04-22 ENCOUNTER — APPOINTMENT (OUTPATIENT)
Dept: CARDIOLOGY | Facility: CLINIC | Age: 84
End: 2024-04-22
Payer: MEDICARE

## 2024-04-22 VITALS — OXYGEN SATURATION: 94 % | DIASTOLIC BLOOD PRESSURE: 77 MMHG | HEART RATE: 116 BPM | SYSTOLIC BLOOD PRESSURE: 152 MMHG

## 2024-04-22 VITALS
DIASTOLIC BLOOD PRESSURE: 87 MMHG | OXYGEN SATURATION: 95 % | HEART RATE: 112 BPM | WEIGHT: 187 LBS | TEMPERATURE: 97.9 F | SYSTOLIC BLOOD PRESSURE: 143 MMHG | BODY MASS INDEX: 34.2 KG/M2

## 2024-04-22 DIAGNOSIS — E78.2 MIXED HYPERLIPIDEMIA: ICD-10-CM

## 2024-04-22 DIAGNOSIS — I48.91 UNSPECIFIED ATRIAL FIBRILLATION: ICD-10-CM

## 2024-04-22 DIAGNOSIS — I10 ESSENTIAL (PRIMARY) HYPERTENSION: ICD-10-CM

## 2024-04-22 PROCEDURE — 99214 OFFICE O/P EST MOD 30 MIN: CPT

## 2024-04-22 PROCEDURE — G2211 COMPLEX E/M VISIT ADD ON: CPT

## 2024-04-22 RX ORDER — APIXABAN 2.5 MG/1
2.5 TABLET, FILM COATED ORAL
Qty: 180 | Refills: 3 | Status: ACTIVE | COMMUNITY
Start: 2019-12-16 | End: 1900-01-01

## 2024-04-23 PROBLEM — I48.91 ATRIAL FIBRILLATION: Status: ACTIVE | Noted: 2019-12-16

## 2024-04-23 PROBLEM — E78.2 HYPERCHOLESTEROLEMIA WITH HYPERTRIGLYCERIDEMIA: Status: ACTIVE | Noted: 2018-04-14

## 2024-04-23 RX ORDER — ATENOLOL 25 MG/1
25 TABLET ORAL
Qty: 180 | Refills: 3 | Status: DISCONTINUED | COMMUNITY
Start: 2021-08-16 | End: 2024-04-23

## 2024-04-23 NOTE — PHYSICAL EXAM
[de-identified] : General: No acute distress HEENT: EOMI  Neck: Supple, No JVD, no bruits Lungs: Clear to auscultation bilaterally; No rales or wheezing Heart: Regular rate and rhythm; No murmurs, rubs, or gallops Abdomen: Nontender, bowel sounds present Extremities: No clubbing, cyanosis; 1+ B/L edema to mid-shin Nervous system:  Alert & Oriented X3, no focal deficits Psychiatric: Normal affect Skin: No rashes or lesions

## 2024-04-23 NOTE — HISTORY OF PRESENT ILLNESS
[FreeTextEntry1] : 84-year-old female with HTN, chronic A. fib on Eliquis, CKD, COVID-19, T2DM, with echocardiogram 08/2021 showing mild LVH and normal EF, who presents for follow-up visit.   Since her last visit, patient has not had any further episodes of epistaxis with the Eliquis. Denies chest pain and dyspnea in general, states she is not very active in setting of arthritis and gout limiting her activity levels. Patient reports compliance with all medications, denies adverse effects. BP at home runs in 130s/80s. Walks with cane in the house, walker when outside.

## 2024-04-23 NOTE — ASSESSMENT
[FreeTextEntry1] : 83-year-old female with HTN, chronic A. fib on Eliquis, CKD, COVID-19, T2DM, with echocardiogram 08/2021 showing mild LVH and normal EF who presents for follow-up visit.  1. HTN: Seems well controlled at home on amlodipine, atenolol, chlorthalidone, telmisartan, and spironolactone -Medications also adjusted by her nephrologist -Continue current medications as listed above  2. Paroxysmal AFib: Continue Eliquis 2.5mg PO BID (age > 80, creatinine > 1.5) -Rate controlled on atenolol (target HR <110 bpm on average). No palpitations, preserved EF -No further bleeding episodes  3. HLD: Patient is out of benefit range of statin for primary prevention due to age > 75. -Continue lifestyle modifications including recommendations for diet and exercise  FUV 6 months or PRN.

## 2024-07-08 ENCOUNTER — APPOINTMENT (OUTPATIENT)
Dept: INTERNAL MEDICINE | Facility: CLINIC | Age: 84
End: 2024-07-08

## 2024-10-16 ENCOUNTER — RX RENEWAL (OUTPATIENT)
Age: 84
End: 2024-10-16

## 2024-10-17 ENCOUNTER — APPOINTMENT (OUTPATIENT)
Dept: INTERNAL MEDICINE | Facility: CLINIC | Age: 84
End: 2024-10-17
Payer: MEDICARE

## 2024-10-17 ENCOUNTER — NON-APPOINTMENT (OUTPATIENT)
Age: 84
End: 2024-10-17

## 2024-10-17 ENCOUNTER — LABORATORY RESULT (OUTPATIENT)
Age: 84
End: 2024-10-17

## 2024-10-17 VITALS
HEIGHT: 62 IN | OXYGEN SATURATION: 94 % | BODY MASS INDEX: 33.68 KG/M2 | DIASTOLIC BLOOD PRESSURE: 71 MMHG | SYSTOLIC BLOOD PRESSURE: 116 MMHG | HEART RATE: 94 BPM | TEMPERATURE: 98.7 F | RESPIRATION RATE: 16 BRPM | WEIGHT: 183 LBS

## 2024-10-17 DIAGNOSIS — I10 ESSENTIAL (PRIMARY) HYPERTENSION: ICD-10-CM

## 2024-10-17 DIAGNOSIS — E78.2 MIXED HYPERLIPIDEMIA: ICD-10-CM

## 2024-10-17 DIAGNOSIS — Z23 ENCOUNTER FOR IMMUNIZATION: ICD-10-CM

## 2024-10-17 DIAGNOSIS — M19.90 UNSPECIFIED OSTEOARTHRITIS, UNSPECIFIED SITE: ICD-10-CM

## 2024-10-17 DIAGNOSIS — I48.91 UNSPECIFIED ATRIAL FIBRILLATION: ICD-10-CM

## 2024-10-17 DIAGNOSIS — N18.4 CHRONIC KIDNEY DISEASE, STAGE 4 (SEVERE): ICD-10-CM

## 2024-10-17 DIAGNOSIS — E11.65 TYPE 2 DIABETES MELLITUS WITH HYPERGLYCEMIA: ICD-10-CM

## 2024-10-17 PROCEDURE — 90662 IIV NO PRSV INCREASED AG IM: CPT

## 2024-10-17 PROCEDURE — 99214 OFFICE O/P EST MOD 30 MIN: CPT

## 2024-10-17 PROCEDURE — G0008: CPT

## 2024-10-17 PROCEDURE — 36415 COLL VENOUS BLD VENIPUNCTURE: CPT

## 2024-10-18 LAB
25(OH)D3 SERPL-MCNC: 48.9 NG/ML
ALBUMIN SERPL ELPH-MCNC: 4.2 G/DL
ALP BLD-CCNC: 54 U/L
ALT SERPL-CCNC: 9 U/L
ANION GAP SERPL CALC-SCNC: 13 MMOL/L
APPEARANCE: CLEAR
AST SERPL-CCNC: 9 U/L
BACTERIA: ABNORMAL /HPF
BASOPHILS # BLD AUTO: 0.1 K/UL
BASOPHILS NFR BLD AUTO: 0.9 %
BILIRUB SERPL-MCNC: 0.5 MG/DL
BILIRUBIN URINE: NEGATIVE
BLOOD URINE: NEGATIVE
BUN SERPL-MCNC: 47 MG/DL
CALCIUM SERPL-MCNC: 9.5 MG/DL
CAST: 3 /LPF
CHLORIDE SERPL-SCNC: 108 MMOL/L
CHOLEST SERPL-MCNC: 207 MG/DL
CO2 SERPL-SCNC: 19 MMOL/L
COLOR: YELLOW
CREAT SERPL-MCNC: 2.34 MG/DL
CREAT SPEC-SCNC: 164 MG/DL
EGFR: 20 ML/MIN/1.73M2
EOSINOPHIL # BLD AUTO: 0.19 K/UL
EOSINOPHIL NFR BLD AUTO: 1.7 %
EPITHELIAL CELLS: 5 /HPF
ESTIMATED AVERAGE GLUCOSE: 143 MG/DL
GGT SERPL-CCNC: 17 U/L
GLUCOSE QUALITATIVE U: NEGATIVE MG/DL
GLUCOSE SERPL-MCNC: 129 MG/DL
HBA1C MFR BLD HPLC: 6.6 %
HCT VFR BLD CALC: 43.5 %
HDLC SERPL-MCNC: 41 MG/DL
HGB BLD-MCNC: 13.7 G/DL
KETONES URINE: NEGATIVE MG/DL
LDLC SERPL CALC-MCNC: 144 MG/DL
LEUKOCYTE ESTERASE URINE: ABNORMAL
LYMPHOCYTES # BLD AUTO: 0.98 K/UL
LYMPHOCYTES NFR BLD AUTO: 8.8 %
MAN DIFF?: NORMAL
MCHC RBC-ENTMCNC: 31.5 GM/DL
MCHC RBC-ENTMCNC: 33.1 PG
MCV RBC AUTO: 105.1 FL
MICROALBUMIN 24H UR DL<=1MG/L-MCNC: 13.9 MG/DL
MICROALBUMIN/CREAT 24H UR-RTO: 84 MG/G
MICROSCOPIC-UA: NORMAL
MONOCYTES # BLD AUTO: 1.17 K/UL
MONOCYTES NFR BLD AUTO: 10.5 %
NEUTROPHILS # BLD AUTO: 8.62 K/UL
NEUTROPHILS NFR BLD AUTO: 77.2 %
NITRITE URINE: NEGATIVE
NONHDLC SERPL-MCNC: 166 MG/DL
PH URINE: 5.5
PLATELET # BLD AUTO: 262 K/UL
POTASSIUM SERPL-SCNC: 6.3 MMOL/L
PROT SERPL-MCNC: 7 G/DL
PROTEIN URINE: 30 MG/DL
RBC # BLD: 4.14 M/UL
RBC # FLD: 14.1 %
RED BLOOD CELLS URINE: 1 /HPF
SODIUM SERPL-SCNC: 140 MMOL/L
SPECIFIC GRAVITY URINE: 1.02
TRIGL SERPL-MCNC: 117 MG/DL
TSH SERPL-ACNC: 1.6 UIU/ML
UROBILINOGEN URINE: 1 MG/DL
WBC # FLD AUTO: 11.16 K/UL
WHITE BLOOD CELLS URINE: 4 /HPF

## 2024-10-19 ENCOUNTER — NON-APPOINTMENT (OUTPATIENT)
Age: 84
End: 2024-10-19

## 2024-10-19 LAB — BACTERIA UR CULT: NORMAL

## 2024-10-22 ENCOUNTER — NON-APPOINTMENT (OUTPATIENT)
Age: 84
End: 2024-10-22

## 2024-12-31 ENCOUNTER — APPOINTMENT (OUTPATIENT)
Dept: CARDIOLOGY | Facility: CLINIC | Age: 84
End: 2024-12-31

## 2025-01-02 ENCOUNTER — RX RENEWAL (OUTPATIENT)
Age: 85
End: 2025-01-02

## 2025-01-03 ENCOUNTER — RX RENEWAL (OUTPATIENT)
Age: 85
End: 2025-01-03

## 2025-02-20 ENCOUNTER — APPOINTMENT (OUTPATIENT)
Dept: INTERNAL MEDICINE | Facility: CLINIC | Age: 85
End: 2025-02-20

## 2025-03-18 ENCOUNTER — APPOINTMENT (OUTPATIENT)
Dept: INTERNAL MEDICINE | Facility: CLINIC | Age: 85
End: 2025-03-18
Payer: MEDICARE

## 2025-03-18 VITALS
DIASTOLIC BLOOD PRESSURE: 88 MMHG | HEIGHT: 62 IN | RESPIRATION RATE: 16 BRPM | BODY MASS INDEX: 35.88 KG/M2 | OXYGEN SATURATION: 95 % | WEIGHT: 195 LBS | SYSTOLIC BLOOD PRESSURE: 152 MMHG | HEART RATE: 100 BPM | TEMPERATURE: 98.3 F

## 2025-03-18 DIAGNOSIS — N18.4 CHRONIC KIDNEY DISEASE, STAGE 4 (SEVERE): ICD-10-CM

## 2025-03-18 DIAGNOSIS — I48.91 UNSPECIFIED ATRIAL FIBRILLATION: ICD-10-CM

## 2025-03-18 DIAGNOSIS — E78.2 MIXED HYPERLIPIDEMIA: ICD-10-CM

## 2025-03-18 DIAGNOSIS — E11.65 TYPE 2 DIABETES MELLITUS WITH HYPERGLYCEMIA: ICD-10-CM

## 2025-03-18 DIAGNOSIS — I10 ESSENTIAL (PRIMARY) HYPERTENSION: ICD-10-CM

## 2025-03-18 DIAGNOSIS — E26.09 OTHER PRIMARY HYPERALDOSTERONISM: ICD-10-CM

## 2025-03-18 PROCEDURE — 36415 COLL VENOUS BLD VENIPUNCTURE: CPT

## 2025-03-18 PROCEDURE — 99214 OFFICE O/P EST MOD 30 MIN: CPT

## 2025-03-18 RX ORDER — SPIRONOLACTONE 25 MG/1
25 TABLET ORAL
Qty: 90 | Refills: 3 | Status: ACTIVE | COMMUNITY

## 2025-03-20 LAB
ALBUMIN SERPL ELPH-MCNC: 4.1 G/DL
ALP BLD-CCNC: 56 U/L
ALT SERPL-CCNC: 12 U/L
ANION GAP SERPL CALC-SCNC: 15 MMOL/L
AST SERPL-CCNC: 12 U/L
BASOPHILS # BLD AUTO: 0.12 K/UL
BASOPHILS NFR BLD AUTO: 1.1 %
BILIRUB SERPL-MCNC: 0.4 MG/DL
BUN SERPL-MCNC: 57 MG/DL
CALCIUM SERPL-MCNC: 9.4 MG/DL
CHLORIDE SERPL-SCNC: 106 MMOL/L
CHOLEST SERPL-MCNC: 210 MG/DL
CO2 SERPL-SCNC: 20 MMOL/L
CREAT SERPL-MCNC: 2.13 MG/DL
EGFRCR SERPLBLD CKD-EPI 2021: 22 ML/MIN/1.73M2
EOSINOPHIL # BLD AUTO: 0.29 K/UL
EOSINOPHIL NFR BLD AUTO: 2.6 %
ESTIMATED AVERAGE GLUCOSE: 154 MG/DL
GGT SERPL-CCNC: 18 U/L
GLUCOSE SERPL-MCNC: 143 MG/DL
HBA1C MFR BLD HPLC: 7 %
HCT VFR BLD CALC: 41.4 %
HDLC SERPL-MCNC: 54 MG/DL
HGB BLD-MCNC: 13.3 G/DL
IMM GRANULOCYTES NFR BLD AUTO: 0.4 %
LDLC SERPL-MCNC: 142 MG/DL
LYMPHOCYTES # BLD AUTO: 1.68 K/UL
LYMPHOCYTES NFR BLD AUTO: 14.9 %
MAN DIFF?: NORMAL
MCHC RBC-ENTMCNC: 32.1 G/DL
MCHC RBC-ENTMCNC: 32.4 PG
MCV RBC AUTO: 100.7 FL
MONOCYTES # BLD AUTO: 1 K/UL
MONOCYTES NFR BLD AUTO: 8.8 %
NEUTROPHILS # BLD AUTO: 8.16 K/UL
NEUTROPHILS NFR BLD AUTO: 72.2 %
NONHDLC SERPL-MCNC: 157 MG/DL
PLATELET # BLD AUTO: 274 K/UL
POTASSIUM SERPL-SCNC: 4.6 MMOL/L
PROT SERPL-MCNC: 7.1 G/DL
RBC # BLD: 4.11 M/UL
RBC # FLD: 14.6 %
SODIUM SERPL-SCNC: 140 MMOL/L
TRIGL SERPL-MCNC: 82 MG/DL
WBC # FLD AUTO: 11.3 K/UL

## 2025-03-24 ENCOUNTER — APPOINTMENT (OUTPATIENT)
Dept: CARDIOLOGY | Facility: CLINIC | Age: 85
End: 2025-03-24

## 2025-04-09 ENCOUNTER — RX RENEWAL (OUTPATIENT)
Age: 85
End: 2025-04-09

## 2025-06-26 ENCOUNTER — APPOINTMENT (OUTPATIENT)
Dept: INTERNAL MEDICINE | Facility: CLINIC | Age: 85
End: 2025-06-26

## 2025-07-02 ENCOUNTER — APPOINTMENT (OUTPATIENT)
Dept: CARDIOLOGY | Facility: CLINIC | Age: 85
End: 2025-07-02
Payer: MEDICARE

## 2025-07-02 ENCOUNTER — RESULT REVIEW (OUTPATIENT)
Age: 85
End: 2025-07-02

## 2025-07-02 VITALS
DIASTOLIC BLOOD PRESSURE: 85 MMHG | RESPIRATION RATE: 16 BRPM | SYSTOLIC BLOOD PRESSURE: 153 MMHG | TEMPERATURE: 96.8 F | OXYGEN SATURATION: 96 % | HEART RATE: 109 BPM | HEIGHT: 62 IN

## 2025-07-02 VITALS — SYSTOLIC BLOOD PRESSURE: 147 MMHG | DIASTOLIC BLOOD PRESSURE: 74 MMHG

## 2025-07-02 PROCEDURE — 93000 ELECTROCARDIOGRAM COMPLETE: CPT

## 2025-07-02 PROCEDURE — 93306 TTE W/DOPPLER COMPLETE: CPT

## 2025-07-02 PROCEDURE — 99214 OFFICE O/P EST MOD 30 MIN: CPT

## 2025-07-19 ENCOUNTER — RX RENEWAL (OUTPATIENT)
Age: 85
End: 2025-07-19